# Patient Record
Sex: FEMALE | Race: WHITE | NOT HISPANIC OR LATINO | Employment: PART TIME | URBAN - METROPOLITAN AREA
[De-identification: names, ages, dates, MRNs, and addresses within clinical notes are randomized per-mention and may not be internally consistent; named-entity substitution may affect disease eponyms.]

---
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
## 2021-01-08 ENCOUNTER — MYC MEDICAL ADVICE (OUTPATIENT)
Dept: FAMILY MEDICINE | Facility: OTHER | Age: 21
End: 2021-01-08

## 2021-01-08 DIAGNOSIS — F90.0 ATTENTION DEFICIT HYPERACTIVITY DISORDER (ADHD), PREDOMINANTLY INATTENTIVE TYPE: ICD-10-CM

## 2021-01-08 RX ORDER — DEXTROAMPHETAMINE/AMPHETAMINE 15 MG
15 CAPSULE, EXT RELEASE 24 HR ORAL DAILY
Qty: 30 CAPSULE | Refills: 0 | Status: SHIPPED | OUTPATIENT
Start: 2021-01-08 | End: 2021-02-03 | Stop reason: DRUGHIGH

## 2021-01-08 RX ORDER — DEXTROAMPHETAMINE SACCHARATE, AMPHETAMINE ASPARTATE, DEXTROAMPHETAMINE SULFATE, AND AMPHETAMINE SULFATE 1.25; 1.25; 1.25; 1.25 MG/1; MG/1; MG/1; MG/1
5 TABLET ORAL DAILY
Qty: 30 TABLET | Refills: 0 | Status: SHIPPED | OUTPATIENT
Start: 2021-01-08 | End: 2021-01-14

## 2021-01-08 NOTE — TELEPHONE ENCOUNTER
Switched to Brand Name.     Please call pharmacy in [place] and cancel the generic.     Tiffanie Sequeira PA-C

## 2021-01-12 ENCOUNTER — TELEPHONE (OUTPATIENT)
Dept: PEDIATRICS | Facility: OTHER | Age: 21
End: 2021-01-12

## 2021-01-12 DIAGNOSIS — F90.0 ATTENTION DEFICIT HYPERACTIVITY DISORDER (ADHD), PREDOMINANTLY INATTENTIVE TYPE: ICD-10-CM

## 2021-01-12 NOTE — TELEPHONE ENCOUNTER
Prior Authorization Retail Medication Request    Medication/Dose: ADDERALL 5 MG tablet  ICD code (if different than what is on RX):    Previously Tried and Failed:    Rationale:      Insurance Name:   Insurance ID:        Pharmacy Information (if different than what is on RX)  Name:    Phone:

## 2021-01-13 NOTE — TELEPHONE ENCOUNTER
So the extended release has to be Brand but the immediate release is Generic, is this correct?    Tiffanie Sequeira PA-C

## 2021-01-13 NOTE — TELEPHONE ENCOUNTER
I spoke with patient's insurance company and the GENERIC adderall (amphetamine-dextroamphetamine) tablets are covered, NOT the brand name. Is patient able to take the generic IR tablets? Please send new Rx to the pharmacy for generic if this is appropriate.     The letter she received from insurance was referring to the XR CAPSULES - these are covered as brand name only this year.

## 2021-01-14 RX ORDER — DEXTROAMPHETAMINE SACCHARATE, AMPHETAMINE ASPARTATE, DEXTROAMPHETAMINE SULFATE AND AMPHETAMINE SULFATE 1.25; 1.25; 1.25; 1.25 MG/1; MG/1; MG/1; MG/1
5 TABLET ORAL DAILY
Qty: 30 TABLET | Refills: 0 | Status: SHIPPED | OUTPATIENT
Start: 2021-01-14 | End: 2021-02-03

## 2021-01-14 NOTE — TELEPHONE ENCOUNTER
Please call pharmacy.  Make sure they cancel the Brand IR and keep the Brand XR.  I sent the Generic IR.  Please make sure patient is aware of this.     Tiffanie Sequeira PA-C

## 2021-01-18 ENCOUNTER — MYC MEDICAL ADVICE (OUTPATIENT)
Dept: FAMILY MEDICINE | Facility: OTHER | Age: 21
End: 2021-01-18

## 2021-01-18 DIAGNOSIS — F41.1 GAD (GENERALIZED ANXIETY DISORDER): ICD-10-CM

## 2021-01-18 DIAGNOSIS — F33.2 SEVERE EPISODE OF RECURRENT MAJOR DEPRESSIVE DISORDER, WITHOUT PSYCHOTIC FEATURES (H): ICD-10-CM

## 2021-01-18 RX ORDER — DESVENLAFAXINE 100 MG/1
100 TABLET, EXTENDED RELEASE ORAL DAILY
Qty: 90 TABLET | Refills: 1 | Status: SHIPPED | OUTPATIENT
Start: 2021-01-18 | End: 2021-06-28

## 2021-02-03 ENCOUNTER — VIRTUAL VISIT (OUTPATIENT)
Dept: FAMILY MEDICINE | Facility: OTHER | Age: 21
End: 2021-02-03
Payer: COMMERCIAL

## 2021-02-03 DIAGNOSIS — F33.2 SEVERE EPISODE OF RECURRENT MAJOR DEPRESSIVE DISORDER, WITHOUT PSYCHOTIC FEATURES (H): ICD-10-CM

## 2021-02-03 DIAGNOSIS — E55.9 VITAMIN D DEFICIENCY: Primary | ICD-10-CM

## 2021-02-03 DIAGNOSIS — F90.0 ATTENTION DEFICIT HYPERACTIVITY DISORDER (ADHD), PREDOMINANTLY INATTENTIVE TYPE: ICD-10-CM

## 2021-02-03 DIAGNOSIS — D50.9 IRON DEFICIENCY ANEMIA, UNSPECIFIED IRON DEFICIENCY ANEMIA TYPE: ICD-10-CM

## 2021-02-03 DIAGNOSIS — F41.1 GAD (GENERALIZED ANXIETY DISORDER): ICD-10-CM

## 2021-02-03 PROCEDURE — 99214 OFFICE O/P EST MOD 30 MIN: CPT | Mod: 95 | Performed by: PHYSICIAN ASSISTANT

## 2021-02-03 RX ORDER — BUPROPION HYDROCHLORIDE 150 MG/1
150 TABLET ORAL EVERY MORNING
Qty: 90 TABLET | Refills: 1 | Status: SHIPPED | OUTPATIENT
Start: 2021-02-03 | End: 2021-08-03

## 2021-02-03 RX ORDER — DEXTROAMPHETAMINE SACCHARATE, AMPHETAMINE ASPARTATE, DEXTROAMPHETAMINE SULFATE AND AMPHETAMINE SULFATE 1.25; 1.25; 1.25; 1.25 MG/1; MG/1; MG/1; MG/1
5 TABLET ORAL DAILY
Qty: 30 TABLET | Refills: 0 | Status: SHIPPED | OUTPATIENT
Start: 2021-02-03 | End: 2021-08-03

## 2021-02-03 RX ORDER — DEXTROAMPHETAMINE SULFATE, DEXTROAMPHETAMINE SACCHARATE, AMPHETAMINE SULFATE AND AMPHETAMINE ASPARTATE 5; 5; 5; 5 MG/1; MG/1; MG/1; MG/1
20 CAPSULE, EXTENDED RELEASE ORAL DAILY
Qty: 30 CAPSULE | Refills: 0 | Status: SHIPPED | OUTPATIENT
Start: 2021-02-03 | End: 2021-03-05

## 2021-02-03 ASSESSMENT — ANXIETY QUESTIONNAIRES
3. WORRYING TOO MUCH ABOUT DIFFERENT THINGS: NOT AT ALL
GAD7 TOTAL SCORE: 5
6. BECOMING EASILY ANNOYED OR IRRITABLE: SEVERAL DAYS
7. FEELING AFRAID AS IF SOMETHING AWFUL MIGHT HAPPEN: NOT AT ALL
5. BEING SO RESTLESS THAT IT IS HARD TO SIT STILL: MORE THAN HALF THE DAYS
1. FEELING NERVOUS, ANXIOUS, OR ON EDGE: SEVERAL DAYS
4. TROUBLE RELAXING: SEVERAL DAYS
7. FEELING AFRAID AS IF SOMETHING AWFUL MIGHT HAPPEN: NOT AT ALL
GAD7 TOTAL SCORE: 5
2. NOT BEING ABLE TO STOP OR CONTROL WORRYING: NOT AT ALL
GAD7 TOTAL SCORE: 5

## 2021-02-03 NOTE — PROGRESS NOTES
Tami is a 20 year old who is being evaluated via a billable video visit.      How would you like to obtain your AVS? MyChart  If the video visit is dropped, the invitation should be resent by: Text to cell phone: [phone]  Will anyone else be joining your video visit? No    Video Start Time: 3:06 PM  Assessment & Plan     FRANKLYN (generalized anxiety disorder)  Feels like she is doing well enough she can be off the Wellbutrin, will place refill at pharmacy in case she needs to restart it but she will just continue on Pristiq for now.   - buPROPion (WELLBUTRIN XL) 150 MG 24 hr tablet; Take 1 tablet (150 mg) by mouth every morning    Severe episode of recurrent major depressive disorder, without psychotic features (H)  See above.   - buPROPion (WELLBUTRIN XL) 150 MG 24 hr tablet; Take 1 tablet (150 mg) by mouth every morning    Attention deficit hyperactivity disorder (ADHD), predominantly inattentive type  Would like to increase to 20 mg XR and see if this works better.  She has been tolerating the medications without side effects.   Did refill 5 mg to use PRN as well.   She will update us in a month if it is working well and we will send refills for 3 months. Will need to know if she is even still taking 5 mg tablet as well. If not then can stop prescribing this dose.    reviewed, as expected.   - ADDERALL XR 20 MG 24 hr capsule; Take 1 capsule (20 mg) by mouth daily  - amphetamine-dextroamphetamine (ADDERALL) 5 MG tablet; Take 1 tablet (5 mg) by mouth daily    Vitamin D deficiency  Continue on Vitamin D supplement.     Iron deficiency anemia, unspecified iron deficiency anemia type  Reviewed last CBC, has been taking iron for a year now.  Ok to stop since her menstrual cycles have nearly stopped since having Nexplanon in place.  Can re-check labs when back home.  She can restart iron If feeling symptomatic.   - CBC with platelets; Future  - Iron and iron binding capacity; Future  - Ferritin; Future      20  minutes spent on the date of the encounter doing chart review, history and exam, documentation and further activities as noted above    Return in about 4 weeks (around 3/3/2021) for Medication Re-check.    Options for treatment and follow-up care were reviewed with the patient and/or guardian. Patient and/or guardian engaged in the decision making process and verbalized understanding of the options discussed and agreed with the final plan.    Tiffanie Sequeira PA-C  [facility] ADRIA Garrett is a 20 year old who presents to clinic today for the following health issues    Answers for HPI/ROS submitted by the patient on 2/3/2021   Chronic problems general questions HPI Form  If you checked off any problems, how difficult have these problems made it for you to do your work, take care of things at home, or get along with other people?: Somewhat difficult  PHQ9 TOTAL SCORE: 8  FRANKLYN 7 TOTAL SCORE: 5    History of Present Illness       Mental Health Follow-up:  Patient presents to follow-up on Depression & Anxiety.Patient's depression since last visit has been:  No change  The patient is not having other symptoms associated with depression.  Patient's anxiety since last visit has been:  No change  The patient is not having other symptoms associated with anxiety.  Any significant life events: No  Patient is not feeling anxious or having panic attacks.  Patient has no concerns about alcohol or drug use.     Social History  Tobacco Use    Smoking status: Passive Smoke Exposure - Never Smoker    Smokeless tobacco: Never Used    Tobacco comment: outside of home  Alcohol use: No    Alcohol/week: 0.0 standard drinks  Drug use: No    Comment: no exposure      Today's PHQ-9         PHQ-9 Total Score:     (P) 8   PHQ-9 Q9 Thoughts of better off dead/self-harm past 2 weeks :   (P) Not at all   Thoughts of suicide or self harm:      Self-harm Plan:        Self-harm Action:          Safety concerns for  self or others:           Tami Rai eats 4 or more servings of fruits and vegetables daily.Tami Rai consumes 0 sweetened beverage(s) daily.Tami Rai exercises with enough effort to increase Tami Graces heart rate 20 to 29 minutes per day.  Tami Rai exercises with enough effort to increase Tami Graces heart rate 4 days per week.   Tami Rai is taking medications regularly.     Review of Systems   Constitutional,cardiovascular, pulmonary, ,and psych systems are negative, except as otherwise noted.      Objective         Vitals:  No vitals were obtained today due to virtual visit.    Physical Exam   GENERAL: Healthy, alert and no distress  EYES: Eyes grossly normal to inspection.  No discharge or erythema, or obvious scleral/conjunctival abnormalities.  RESP: No audible wheeze, cough, or visible cyanosis.  No visible retractions or increased work of breathing.    SKIN: Visible skin clear. No significant rash, abnormal pigmentation or lesions.  NEURO: Cranial nerves grossly intact.  Mentation and speech appropriate for age.  PSYCH: Mentation appears normal, affect normal/bright, judgement and insight intact, normal speech and appearance well-groomed.    Video-Visit Details    Type of service:  Video Visit    Video End Time:3:19 PM    Originating Location (pt. Location): Other [facility]    Distant Location (provider location):  [facility]     Platform used for Video Visit: [place]

## 2021-02-04 ASSESSMENT — PATIENT HEALTH QUESTIONNAIRE - PHQ9: SUM OF ALL RESPONSES TO PHQ QUESTIONS 1-9: 8

## 2021-02-04 ASSESSMENT — ANXIETY QUESTIONNAIRES: GAD7 TOTAL SCORE: 5

## 2021-03-30 ENCOUNTER — MYC MEDICAL ADVICE (OUTPATIENT)
Dept: FAMILY MEDICINE | Facility: OTHER | Age: 21
End: 2021-03-30

## 2021-03-30 DIAGNOSIS — F90.0 ATTENTION DEFICIT HYPERACTIVITY DISORDER (ADHD), PREDOMINANTLY INATTENTIVE TYPE: Primary | ICD-10-CM

## 2021-03-30 RX ORDER — DEXTROAMPHETAMINE SACCHARATE, AMPHETAMINE ASPARTATE, DEXTROAMPHETAMINE SULFATE AND AMPHETAMINE SULFATE 1.25; 1.25; 1.25; 1.25 MG/1; MG/1; MG/1; MG/1
5 TABLET ORAL DAILY
Qty: 30 TABLET | Refills: 0 | Status: SHIPPED | OUTPATIENT
Start: 2021-03-30 | End: 2021-04-29

## 2021-03-30 RX ORDER — DEXTROAMPHETAMINE SACCHARATE, AMPHETAMINE ASPARTATE, DEXTROAMPHETAMINE SULFATE AND AMPHETAMINE SULFATE 1.25; 1.25; 1.25; 1.25 MG/1; MG/1; MG/1; MG/1
5 TABLET ORAL DAILY
Qty: 30 TABLET | Refills: 0 | Status: SHIPPED | OUTPATIENT
Start: 2021-04-30 | End: 2021-05-30

## 2021-03-30 RX ORDER — DEXTROAMPHETAMINE SACCHARATE, AMPHETAMINE ASPARTATE, DEXTROAMPHETAMINE SULFATE AND AMPHETAMINE SULFATE 1.25; 1.25; 1.25; 1.25 MG/1; MG/1; MG/1; MG/1
5 TABLET ORAL DAILY
Qty: 30 TABLET | Refills: 0 | Status: SHIPPED | OUTPATIENT
Start: 2021-05-31 | End: 2021-06-30

## 2021-03-30 RX ORDER — DEXTROAMPHETAMINE SACCHARATE, AMPHETAMINE ASPARTATE MONOHYDRATE, DEXTROAMPHETAMINE SULFATE AND AMPHETAMINE SULFATE 5; 5; 5; 5 MG/1; MG/1; MG/1; MG/1
20 CAPSULE, EXTENDED RELEASE ORAL DAILY
Qty: 30 CAPSULE | Refills: 0 | Status: SHIPPED | OUTPATIENT
Start: 2021-05-31 | End: 2021-06-28

## 2021-03-30 RX ORDER — DEXTROAMPHETAMINE SACCHARATE, AMPHETAMINE ASPARTATE MONOHYDRATE, DEXTROAMPHETAMINE SULFATE AND AMPHETAMINE SULFATE 5; 5; 5; 5 MG/1; MG/1; MG/1; MG/1
20 CAPSULE, EXTENDED RELEASE ORAL DAILY
Qty: 30 CAPSULE | Refills: 0 | Status: SHIPPED | OUTPATIENT
Start: 2021-03-30 | End: 2021-04-29

## 2021-03-30 RX ORDER — DEXTROAMPHETAMINE SACCHARATE, AMPHETAMINE ASPARTATE MONOHYDRATE, DEXTROAMPHETAMINE SULFATE AND AMPHETAMINE SULFATE 5; 5; 5; 5 MG/1; MG/1; MG/1; MG/1
20 CAPSULE, EXTENDED RELEASE ORAL DAILY
Qty: 30 CAPSULE | Refills: 0 | Status: SHIPPED | OUTPATIENT
Start: 2021-04-30 | End: 2021-05-30

## 2021-06-15 NOTE — LETTER
[address]  [facility] [address]  Phone: [phone]    October 4, 2019        Tami Rai  [address]  [address]  [facility] [address]          To whom it may concern:    RE: Tami Rai    Patient was seen and treated in our clinic. Please excuse patient from work on the dates 10-3 and 10-4.     Please contact me for questions or concerns.      Sincerely,        Alyssa Farrell PA-C  
show

## 2021-06-25 NOTE — MR AVS SNAPSHOT
"              After Visit Summary   1/17/2017    aTmi Rai    MRN: [id number]           Patient Information     Date Of Birth          2000        Visit Information        Provider Department      1/17/2017 11:00 AM Radha Harvey APRN CNP [facility]        Today's Diagnoses     Influenza-like illness    -  1     Hopelessness         Anxious mood            Follow-ups after your visit        Who to contact     If you have questions or need follow up information about today's clinic visit or your schedule please contact [facility] directly at [phone].  Normal or non-critical lab and imaging results will be communicated to you by ITDatabasehart, letter or phone within 4 business days after the clinic has received the results. If you do not hear from us within 7 days, please contact the clinic through ITDatabasehart or phone. If you have a critical or abnormal lab result, we will notify you by phone as soon as possible.  Submit refill requests through Sisteer or call your pharmacy and they will forward the refill request to us. Please allow 3 business days for your refill to be completed.          Additional Information About Your Visit        MyChart Information     Sisteer lets you send messages to your doctor, view your test results, renew your prescriptions, schedule appointments and more. To sign up, go to [email or web address], contact your [facility] clinic or call [phone] during business hours.            Care EveryWhere ID     This is your Care EveryWhere ID. This could be used by other organizations to access your [facility] medical records  [id number]        Your Vitals Were     Pulse Temperature Respirations    70 97.7  F (36.5  C) (Temporal) 10    Height BMI (Body Mass Index) Last Period    5' 5\" (1.651 m) 35.28 kg/m2 12/21/2016 (Exact Date)    Breastfeeding?          No         Blood Pressure from Last 3 Encounters:   01/17/17 124/68   12/14/16 122/80   12/08/16 " 110/58    Weight from Last 3 Encounters:   01/17/17 212 lb (96.163 kg) (98.84 %*)   12/14/16 212 lb (96.163 kg) (98.87 %*)   12/08/16 207 lb (93.895 kg) (98.70 %*)     * Growth percentiles are based on [facility] 2-20 Years data.              We Performed the Following     Influenza A/B antigen        Primary Care Provider Office Phone # Fax #    Nikki Rai [phone]       [facility] [address]  [facility] [address]        Thank you!     Thank you for choosing [facility]  for your care. Our goal is always to provide you with excellent care. Hearing back from our patients is one way we can continue to improve our services. Please take a few minutes to complete the written survey that you may receive in the mail after your visit with us. Thank you!             Your Updated Medication List - Protect others around you: Learn how to safely use, store and throw away your medicines at www.disposemymeds.org.          This list is accurate as of: 1/17/17  1:46 PM.  Always use your most recent med list.                   Brand Name Dispense Instructions for use    MELATONIN PO      Take 10 mg by mouth nightly as needed       study - exenatide ER 2mg or placebo ER subcutaneous SOLR    IDS# [id number]    13 kit    Inject 2 mg Subcutaneous once a week into the upper arm, abdomen or thigh.       vitamin D 2000 UNITS tablet     30 tablet    Take 2,000 Units by mouth daily          0 = independent

## 2021-06-28 DIAGNOSIS — F33.2 SEVERE EPISODE OF RECURRENT MAJOR DEPRESSIVE DISORDER, WITHOUT PSYCHOTIC FEATURES (H): ICD-10-CM

## 2021-06-28 DIAGNOSIS — F41.1 GAD (GENERALIZED ANXIETY DISORDER): ICD-10-CM

## 2021-06-28 RX ORDER — DESVENLAFAXINE 100 MG/1
TABLET, EXTENDED RELEASE ORAL
Qty: 90 TABLET | Refills: 0 | Status: SHIPPED | OUTPATIENT
Start: 2021-06-28 | End: 2021-08-03

## 2021-06-28 NOTE — TELEPHONE ENCOUNTER
Pending Prescriptions:                       Disp   Refills    desvenlafaxine (PRISTIQ) 100 MG 24 hr tabl*90 tab*0        Sig: Take 1 tablet by mouth once daily    Routing refill request to provider for review/approval because:  Serotonin-Norepinephrine Reuptake Inhibitors  Failed      Blood pressure under 140/90 in past 12 months        BP Readings from Last 3 Encounters:   03/10/20 116/76   03/05/20 122/72   03/02/20 112/72            PHQ-9 score of less than 5 in past 6 months    Please review last PHQ-9 score.      PHQ-9 score:    PHQ 2/3/2021   PHQ-9 Total Score 8   Q9: Thoughts of better off dead/self-harm past 2 weeks Not at all   F/U: Thoughts of suicide or self-harm -   F/U: Safety concerns -               Normal serum creatinine on file in past 12 months        Recent Labs   Lab Test 03/28/19  0806   CR 0.67

## 2021-07-30 ENCOUNTER — MYC REFILL (OUTPATIENT)
Dept: FAMILY MEDICINE | Facility: OTHER | Age: 21
End: 2021-07-30

## 2021-07-30 DIAGNOSIS — F33.2 SEVERE EPISODE OF RECURRENT MAJOR DEPRESSIVE DISORDER, WITHOUT PSYCHOTIC FEATURES (H): ICD-10-CM

## 2021-07-30 DIAGNOSIS — F41.1 GAD (GENERALIZED ANXIETY DISORDER): ICD-10-CM

## 2021-07-30 RX ORDER — DESVENLAFAXINE 100 MG/1
100 TABLET, EXTENDED RELEASE ORAL DAILY
Qty: 90 TABLET | Refills: 0 | OUTPATIENT
Start: 2021-07-30

## 2021-07-30 NOTE — TELEPHONE ENCOUNTER
Sent 90 day supply end of June.     She is overdue for med check as well.     Tiffanie Sequeira PA-C

## 2021-08-03 ENCOUNTER — MYC MEDICAL ADVICE (OUTPATIENT)
Dept: FAMILY MEDICINE | Facility: OTHER | Age: 21
End: 2021-08-03

## 2021-08-03 ENCOUNTER — VIRTUAL VISIT (OUTPATIENT)
Dept: FAMILY MEDICINE | Facility: OTHER | Age: 21
End: 2021-08-03
Payer: COMMERCIAL

## 2021-08-03 DIAGNOSIS — F33.2 SEVERE EPISODE OF RECURRENT MAJOR DEPRESSIVE DISORDER, WITHOUT PSYCHOTIC FEATURES (H): ICD-10-CM

## 2021-08-03 DIAGNOSIS — G43.019 INTRACTABLE MIGRAINE WITHOUT AURA AND WITHOUT STATUS MIGRAINOSUS: ICD-10-CM

## 2021-08-03 DIAGNOSIS — F90.0 ATTENTION DEFICIT HYPERACTIVITY DISORDER (ADHD), PREDOMINANTLY INATTENTIVE TYPE: Primary | ICD-10-CM

## 2021-08-03 DIAGNOSIS — J98.01 ACUTE BRONCHOSPASM: ICD-10-CM

## 2021-08-03 DIAGNOSIS — F41.1 GAD (GENERALIZED ANXIETY DISORDER): ICD-10-CM

## 2021-08-03 PROCEDURE — 99213 OFFICE O/P EST LOW 20 MIN: CPT | Mod: 95 | Performed by: PHYSICIAN ASSISTANT

## 2021-08-03 RX ORDER — DEXTROAMPHETAMINE SACCHARATE, AMPHETAMINE ASPARTATE MONOHYDRATE, DEXTROAMPHETAMINE SULFATE AND AMPHETAMINE SULFATE 5; 5; 5; 5 MG/1; MG/1; MG/1; MG/1
20 CAPSULE, EXTENDED RELEASE ORAL DAILY
Qty: 30 CAPSULE | Refills: 0 | Status: SHIPPED | OUTPATIENT
Start: 2021-09-02 | End: 2021-08-03

## 2021-08-03 RX ORDER — DEXTROAMPHETAMINE SACCHARATE, AMPHETAMINE ASPARTATE MONOHYDRATE, DEXTROAMPHETAMINE SULFATE AND AMPHETAMINE SULFATE 5; 5; 5; 5 MG/1; MG/1; MG/1; MG/1
20 CAPSULE, EXTENDED RELEASE ORAL DAILY
Qty: 30 CAPSULE | Refills: 0 | Status: SHIPPED | OUTPATIENT
Start: 2021-08-03 | End: 2021-08-03

## 2021-08-03 RX ORDER — DEXTROAMPHETAMINE SACCHARATE, AMPHETAMINE ASPARTATE MONOHYDRATE, DEXTROAMPHETAMINE SULFATE AND AMPHETAMINE SULFATE 5; 5; 5; 5 MG/1; MG/1; MG/1; MG/1
20 CAPSULE, EXTENDED RELEASE ORAL DAILY
Qty: 30 CAPSULE | Refills: 0 | Status: SHIPPED | OUTPATIENT
Start: 2021-10-02 | End: 2021-10-11

## 2021-08-03 RX ORDER — ALBUTEROL SULFATE 90 UG/1
2 AEROSOL, METERED RESPIRATORY (INHALATION) EVERY 4 HOURS PRN
Qty: 18 G | Refills: 4 | Status: SHIPPED | OUTPATIENT
Start: 2021-08-03 | End: 2024-01-26

## 2021-08-03 RX ORDER — ALBUTEROL SULFATE 0.83 MG/ML
2.5 SOLUTION RESPIRATORY (INHALATION) EVERY 6 HOURS PRN
Qty: 90 ML | Refills: 3 | Status: SHIPPED | OUTPATIENT
Start: 2021-08-03

## 2021-08-03 RX ORDER — RIZATRIPTAN BENZOATE 10 MG/1
10 TABLET ORAL
Qty: 10 TABLET | Refills: 1 | Status: SHIPPED | OUTPATIENT
Start: 2021-08-03 | End: 2022-03-30

## 2021-08-03 RX ORDER — DEXTROAMPHETAMINE SACCHARATE, AMPHETAMINE ASPARTATE, DEXTROAMPHETAMINE SULFATE AND AMPHETAMINE SULFATE 1.25; 1.25; 1.25; 1.25 MG/1; MG/1; MG/1; MG/1
5 TABLET ORAL DAILY
Qty: 30 TABLET | Refills: 0 | Status: SHIPPED | OUTPATIENT
Start: 2021-10-02 | End: 2021-11-01

## 2021-08-03 RX ORDER — DEXTROAMPHETAMINE SACCHARATE, AMPHETAMINE ASPARTATE, DEXTROAMPHETAMINE SULFATE AND AMPHETAMINE SULFATE 1.25; 1.25; 1.25; 1.25 MG/1; MG/1; MG/1; MG/1
5 TABLET ORAL DAILY
Qty: 30 TABLET | Refills: 0 | Status: SHIPPED | OUTPATIENT
Start: 2021-08-03 | End: 2021-08-03

## 2021-08-03 RX ORDER — DESVENLAFAXINE 100 MG/1
100 TABLET, EXTENDED RELEASE ORAL DAILY
Qty: 90 TABLET | Refills: 1 | Status: SHIPPED | OUTPATIENT
Start: 2021-08-03 | End: 2021-11-04

## 2021-08-03 RX ORDER — DEXTROAMPHETAMINE SACCHARATE, AMPHETAMINE ASPARTATE, DEXTROAMPHETAMINE SULFATE AND AMPHETAMINE SULFATE 1.25; 1.25; 1.25; 1.25 MG/1; MG/1; MG/1; MG/1
5 TABLET ORAL DAILY
Qty: 30 TABLET | Refills: 0 | Status: SHIPPED | OUTPATIENT
Start: 2021-09-02 | End: 2021-08-03

## 2021-08-03 ASSESSMENT — ANXIETY QUESTIONNAIRES
IF YOU CHECKED OFF ANY PROBLEMS ON THIS QUESTIONNAIRE, HOW DIFFICULT HAVE THESE PROBLEMS MADE IT FOR YOU TO DO YOUR WORK, TAKE CARE OF THINGS AT HOME, OR GET ALONG WITH OTHER PEOPLE: SOMEWHAT DIFFICULT
7. FEELING AFRAID AS IF SOMETHING AWFUL MIGHT HAPPEN: NOT AT ALL
GAD7 TOTAL SCORE: 3
6. BECOMING EASILY ANNOYED OR IRRITABLE: NOT AT ALL
2. NOT BEING ABLE TO STOP OR CONTROL WORRYING: NOT AT ALL
1. FEELING NERVOUS, ANXIOUS, OR ON EDGE: SEVERAL DAYS
5. BEING SO RESTLESS THAT IT IS HARD TO SIT STILL: SEVERAL DAYS
3. WORRYING TOO MUCH ABOUT DIFFERENT THINGS: NOT AT ALL

## 2021-08-03 ASSESSMENT — PAIN SCALES - GENERAL: PAINLEVEL: NO PAIN (0)

## 2021-08-03 ASSESSMENT — PATIENT HEALTH QUESTIONNAIRE - PHQ9
5. POOR APPETITE OR OVEREATING: SEVERAL DAYS
SUM OF ALL RESPONSES TO PHQ QUESTIONS 1-9: 7

## 2021-08-03 NOTE — PROGRESS NOTES
Keri is a 21 year old who is being evaluated via a billable video visit.      How would you like to obtain your AVS? MyChart  If the video visit is dropped, the invitation should be resent by: Text to cell phone: [phone]  Will anyone else be joining your video visit? No    Video Start Time: 3:46 PM     Assessment & Plan     Attention deficit hyperactivity disorder (ADHD), predominantly inattentive type  For the most part stable, hasn't had to use medication as frequently during the summer.   Refilled for 3 months.   - amphetamine-dextroamphetamine (ADDERALL) 5 MG tablet; Take 1 tablet (5 mg) by mouth daily  - amphetamine-dextroamphetamine (ADDERALL XR) 20 MG 24 hr capsule; Take 1 capsule (20 mg) by mouth daily    Severe episode of recurrent major depressive disorder, without psychotic features (H)  Doing well on medication, refilled for 6 months.   - desvenlafaxine (PRISTIQ) 100 MG 24 hr tablet; Take 1 tablet (100 mg) by mouth daily    FRANKLYN (generalized anxiety disorder)  Doing well on medication refilled for 6 months.   - desvenlafaxine (PRISTIQ) 100 MG 24 hr tablet; Take 1 tablet (100 mg) by mouth daily    Intractable migraine without aura and without status migrainosus  Question if the air quality is affecting migraines, monitor closely as school year starts for now no changes to plan or medications.  - rizatriptan (MAXALT) 10 MG tablet; Take 1 tablet (10 mg) by mouth at onset of headache for migraine May repeat in 2 hours. Max 3 tablets/24 hours.    Acute bronchospasm  Refilled her albuterol to use PRN.   AAP and ACT updated and no concerns.   - albuterol (PROAIR HFA/PROVENTIL HFA/VENTOLIN HFA) 108 (90 Base) MCG/ACT inhaler; Inhale 2 puffs into the lungs every 4 hours as needed for shortness of breath / dyspnea or wheezing  - albuterol (PROVENTIL) (2.5 MG/3ML) 0.083% neb solution; Take 1 vial (2.5 mg) by nebulization every 6 hours as needed for shortness of breath / dyspnea or wheezing      Return in about  3 months (around 11/3/2021) for Medication Re-check.    Options for treatment and follow-up care were reviewed with the patient and/or guardian. Patient and/or guardian engaged in the decision making process and verbalized understanding of the options discussed and agreed with the final plan.     Tiffanie Sequeira PA-C  [facility] ServicesLIZZIE Saavedra is a 21 year old who presents for the following health issues     HPI     Tami is here today to recheck ADHD/ADD.    Updates since last visit: has been off her adderall for most of the summer     Routine for taking medicine, including time: 7-11 am   Time medicine wears off: between 3-4 pm about 5-6 hours  Issues at school: none  Issues at home: none  Control of symptoms: yes    Side effects:  Headaches: No  Stomach aches: No  Irritability/mood swings: No  Difficulties with sleep: No  Social withdrawal: No  Unusual movements/tics: No  Decreased appetite: No    Other concerns: struggling with migraines and having a hard time getting up     Off schedule for the summer but getting back into school will help.       Depression and Anxiety Follow-Up    How are you doing with your depression since your last visit? No change    How are you doing with your anxiety since your last visit?  No change    Are you having other symptoms that might be associated with depression or anxiety? Yes:  irregular sleeping, falling asleep, waking up in the morning     Have you had a significant life event? No     Do you have any concerns with your use of alcohol or other drugs? No    Social History     Tobacco Use     Smoking status: Passive Smoke Exposure - Never Smoker     Smokeless tobacco: Never Used     Tobacco comment: outside of home   Vaping Use     Vaping Use: Never used   Substance Use Topics     Alcohol use: No     Alcohol/week: 0.0 standard drinks     Drug use: No     Comment: no exposure     PHQ 10/16/2020 2/3/2021 8/3/2021   PHQ-9 Total Score 5 8 7   Q9:  Thoughts of better off dead/self-harm past 2 weeks Not at all Not at all Not at all   F/U: Thoughts of suicide or self-harm - - -   F/U: Safety concerns - - -     FRANKLYN-7 SCORE 10/16/2020 2/3/2021 8/3/2021   Total Score 7 (mild anxiety) 5 (mild anxiety) -   Total Score 7 5 3     Last PHQ-9 8/3/2021   1.  Little interest or pleasure in doing things 0   2.  Feeling down, depressed, or hopeless 0   3.  Trouble falling or staying asleep, or sleeping too much 2   4.  Feeling tired or having little energy 1   5.  Poor appetite or overeating 0   6.  Feeling bad about yourself 0   7.  Trouble concentrating 2   8.  Moving slowly or restless 2   Q9: Thoughts of better off dead/self-harm past 2 weeks 0   PHQ-9 Total Score 7   Difficulty at work, home, or with people Not difficult at all   In the past two weeks have you had thoughts of suicide or self harm? -   Do you have concerns about your personal safety or the safety of others? -     FRANKLYN-7  8/3/2021   1. Feeling nervous, anxious, or on edge 1   2. Not being able to stop or control worrying 0   3. Worrying too much about different things 0   4. Trouble relaxing 1   5. Being so restless that it is hard to sit still 1   6. Becoming easily annoyed or irritable 0   7. Feeling afraid, as if something awful might happen 0   FRANKLYN-7 Total Score 3   If you checked any problems, how difficult have they made it for you to do your work, take care of things at home, or get along with other people? Somewhat difficult         Asthma Follow-Up    Was ACT completed today?    Yes    ACT Total Scores 8/3/2021   ACT TOTAL SCORE -   ASTHMA ER VISITS -   ASTHMA HOSPITALIZATIONS -   ACT TOTAL SCORE (Goal Greater than or Equal to 20) 25   In the past 12 months, how many times did you visit the emergency room for your asthma without being admitted to the hospital? 0   In the past 12 months, how many times were you hospitalized overnight because of your asthma? 0          How many days per week do you  miss taking your asthma controller medication?  I do not have an asthma controller medication    Please describe any recent triggers for your asthma: Patient is unaware of triggers    Have you had any Emergency Room Visits, Urgent Care Visits, or Hospital Admissions since your last office visit?  No    Migraine     Since your last clinic visit, how have your headaches changed?  Worsened    How often are you getting headaches or migraines? Twice weekly      Are you able to do normal daily activities when you have a migraine? No    Are you taking rescue/relief medications? (Select all that apply) Excedrin and Maxalt    How helpful is your rescue/relief medication?  I get some relief    Are you taking any medications to prevent migraines? (Select all that apply)  No  In the past 4 weeks, how often have you gone to urgent care or the emergency room because of your headaches?  0    Review of Systems   Constitutional, HEENT, cardiovascular, pulmonary, GI, , musculoskeletal, neuro, skin, endocrine and psych systems are negative, except as otherwise noted.      Objective     Vitals - Patient Reported  Pain Score: No Pain (0)      Vitals:  No vitals were obtained today due to virtual visit.    Physical Exam   GENERAL: Healthy, alert and no distress  EYES: Eyes grossly normal to inspection.  No discharge or erythema, or obvious scleral/conjunctival abnormalities.  RESP: No audible wheeze, cough, or visible cyanosis.  No visible retractions or increased work of breathing.    SKIN: Visible skin clear. No significant rash, abnormal pigmentation or lesions.  NEURO: Cranial nerves grossly intact.  Mentation and speech appropriate for age.  PSYCH: Mentation appears normal, affect normal/bright, judgement and insight intact, normal speech and appearance well-groomed.          Video-Visit Details    Type of service:  Video Visit    Video End Time:3:55 PM    Originating Location (pt. Location): Home    Distant Location (provider  location):  [facility]     Platform used for Video Visit: [employer]

## 2021-08-03 NOTE — LETTER
My Asthma Action Plan    Name: Tami Rai   YOB: 2000  Date: 8/3/2021   My doctor: Tiffanie Sequeira PA-C   My clinic: [facility]        My Rescue Medicine:   Albuterol inhaler (Proair/Ventolin/Proventil HFA)  2-4 puffs EVERY 4 HOURS as needed. Use a spacer if recommended by your provider.   My Asthma Severity:   Intermittent / Exercise Induced  Know your asthma triggers:    Patient is unaware of triggers          GREEN ZONE   Good Control    I feel good    No cough or wheeze    Can work, sleep and play without asthma symptoms       Take your asthma control medicine every day.     1. If exercise triggers your asthma, take your rescue medication    15 minutes before exercise or sports, and    During exercise if you have asthma symptoms  2. Spacer to use with inhaler: If you have a spacer, make sure to use it with your inhaler             YELLOW ZONE Getting Worse  I have ANY of these:    I do not feel good    Cough or wheeze    Chest feels tight    Wake up at night   1. Keep taking your Green Zone medications  2. Start taking your rescue medicine:    every 20 minutes for up to 1 hour. Then every 4 hours for 24-48 hours.  3. If you stay in the Yellow Zone for more than 12-24 hours, contact your doctor.  4. If you do not return to the Green Zone in 12-24 hours or you get worse, start taking your oral steroid medicine if prescribed by your provider.           RED ZONE Medical Alert - Get Help  I have ANY of these:    I feel awful    Medicine is not helping    Breathing getting harder    Trouble walking or talking    Nose opens wide to breathe       1. Take your rescue medicine NOW  2. If your provider has prescribed an oral steroid medicine, start taking it NOW  3. Call your doctor NOW  4. If you are still in the Red Zone after 20 minutes and you have not reached your doctor:    Take your rescue medicine again and    Call 911 or go to the emergency room right away    See your  regular doctor within 2 weeks of an Emergency Room or Urgent Care visit for follow-up treatment.          Annual Reminders:  Meet with Asthma Educator,  Flu Shot in the Fall, consider Pneumonia Vaccination for patients with asthma (aged 19 and older).    Pharmacy:    [facility] [place] [facility]  [facility] - [place], [place] - [address] [facility] - [place], [place] - [address] [facility]    Electronically signed by Tiffanie Sequeira PA-C   Date: 08/03/21                    Asthma Triggers  How To Control Things That Make Your Asthma Worse    Triggers are things that make your asthma worse.  Look at the list below to help you find your triggers and   what you can do about them. You can help prevent asthma flare-ups by staying away from your triggers.      Trigger                                                          What you can do   Cigarette Smoke  Tobacco smoke can make asthma worse. Do not allow smoking in your home, car or around you.  Be sure no one smokes at a child s day care or school.  If you smoke, ask your health care provider for ways to help you quit.  Ask family members to quit too.  Ask your health care provider for a referral to Quit Plan to help you quit smoking, or call 3-237-533-PLAN.     Colds, Flu, Bronchitis  These are common triggers of asthma. Wash your hands often.  Don t touch your eyes, nose or mouth.  Get a flu shot every year.     Dust Mites  These are tiny bugs that live in cloth or carpet. They are too small to see. Wash sheets and blankets in hot water every week.   Encase pillows and mattress in dust mite proof covers.  Avoid having carpet if you can. If you have carpet, vacuum weekly.   Use a dust mask and HEPA vacuum.   Pollen and Outdoor Mold  Some people are allergic to trees, grass, or weed pollen, or molds. Try to keep your windows closed.  Limit time out doors when pollen count is  high.   Ask you health care provider about taking medicine during allergy season.     Animal Dander  Some people are allergic to skin flakes, urine or saliva from pets with fur or feathers. Keep pets with fur or feathers out of your home.    If you can t keep the pet outdoors, then keep the pet out of your bedroom.  Keep the bedroom door closed.  Keep pets off cloth furniture and away from stuffed toys.     Mice, Rats, and Cockroaches  Some people are allergic to the waste from these pests.   Cover food and garbage.  Clean up spills and food crumbs.  Store grease in the refrigerator.   Keep food out of the bedroom.   Indoor Mold  This can be a trigger if your home has high moisture. Fix leaking faucets, pipes, or other sources of water.   Clean moldy surfaces.  Dehumidify basement if it is damp and smelly.   Smoke, Strong Odors, and Sprays  These can reduce air quality. Stay away from strong odors and sprays, such as perfume, powder, hair spray, paints, smoke incense, paint, cleaning products, candles and new carpet.   Exercise or Sports  Some people with asthma have this trigger. Be active!  Ask your doctor about taking medicine before sports or exercise to prevent symptoms.    Warm up for 5-10 minutes before and after sports or exercise.     Other Triggers of Asthma  Cold air:  Cover your nose and mouth with a scarf.  Sometimes laughing or crying can be a trigger.  Some medicines and food can trigger asthma.

## 2021-08-03 NOTE — LETTER
My Depression Action Plan  Name: Tami Rai   Date of Birth 2000  Date: 8/3/2021    My doctor: Tiffanie Sequeira   My clinic: [address]  [facility] [id number] [phone]          GREEN    ZONE   Good Control    What it looks like:     Things are going generally well. You have normal ups and downs. You may even feel depressed from time to time, but bad moods usually last less than a day.   What you need to do:  1. Continue to care for yourself (see self care plan)  2. Check your depression survival kit and update it as needed  3. Follow your physician s recommendations including any medication.  4. Do not stop taking medication unless you consult with your physician first.           YELLOW         ZONE Getting Worse    What it looks like:     Depression is starting to interfere with your life.     It may be hard to get out of bed; you may be starting to isolate yourself from others.    Symptoms of depression are starting to last most all day and this has happened for several days.     You may have suicidal thoughts but they are not constant.   What you need to do:     1. Call your care team. Your response to treatment will improve if you keep your care team informed of your progress. Yellow periods are signs an adjustment may need to be made.     2. Continue your self-care.  Just get dressed and ready for the day.  Don't give yourself time to talk yourself out of it.    3. Talk to someone in your support network.    4. Open up your Depression Self-Care Plan/Wellness Kit.           RED    ZONE Medical Alert - Get Help    What it looks like:     Depression is seriously interfering with your life.     You may experience these or other symptoms: You can t get out of bed most days, can t work or engage in other necessary activities, you have trouble taking care of basic hygiene, or basic responsibilities, thoughts of suicide or death that will  not go away, self-injurious behavior.     What you need to do:  1. Call your care team and request a same-day appointment. If they are not available (weekends or after hours) call your local crisis line, emergency room or 911.          Depression Self-Care Plan / Wellness Kit    Many people find that medication and therapy are helpful treatments for managing depression. In addition, making small changes to your everyday life can help to boost your mood and improve your wellbeing. Below are some tips for you to consider. Be sure to talk with your medical provider and/or behavioral health consultant if your symptoms are worsening or not improving.     Sleep   Sleep hygiene  means all of the habits that support good, restful sleep. It includes maintaining a consistent bedtime and wake time, using your bedroom only for sleeping or sex, and keeping the bedroom dark and free of distractions like a computer, smartphone, or television.     Develop a Healthy Routine  Maintain good hygiene. Get out of bed in the morning, make your bed, brush your teeth, take a shower, and get dressed. Don t spend too much time viewing media that makes you feel stressed. Find time to relax each day.    Exercise  Get some form of exercise every day. This will help reduce pain and release endorphins, the  feel good  chemicals in your brain. It can be as simple as just going for a walk or doing some gardening, anything that will get you moving.      Diet  Strive to eat healthy foods, including fruits and vegetables. Drink plenty of water. Avoid excessive sugar, caffeine, alcohol, and other mood-altering substances.     Stay Connected with Others  Stay in touch with friends and family members.    Manage Your Mood  Try deep breathing, massage therapy, biofeedback, or meditation. Take part in fun activities when you can. Try to find something to smile about each day.     Psychotherapy  Be open to working with a therapist if your provider recommends  it.     Medication  Be sure to take your medication as prescribed. Most anti-depressants need to be taken every day. It usually takes several weeks for medications to work. Not all medicines work for all people. It is important to follow-up with your provider to make sure you have a treatment plan that is working for you. Do not stop your medication abruptly without first discussing it with your provider.    Crisis Resources   These hotlines are for both adults and children. They and are open 24 hours a day, 7 days a week unless noted otherwise.      National Suicide Prevention Lifeline   9-331-111-TALK (0427)      Crisis Text Line    www.crisistextline.org  Text HOME to 311976 from anywhere in the [place], anytime, about any type of crisis. A live, trained crisis counselor will receive the text and respond quickly.      [facility] Lifeline for LGBTQ Youth  A national crisis intervention and suicide lifeline for LGBTQ youth under 25. Provides a safe place to talk without judgement. Call 1-581.164.3772; text START to 867344 or visit www.thetrevorproject.org to talk to a trained counselor.      For [facility] crisis numbers, visit the [facility] website at:  https://[place].gov/dhs/people-we-serve/adults/health-care/mental-health/resources/crisis-contacts.jsp

## 2021-08-04 ASSESSMENT — ANXIETY QUESTIONNAIRES: GAD7 TOTAL SCORE: 3

## 2021-08-04 ASSESSMENT — ASTHMA QUESTIONNAIRES: ACT_TOTALSCORE: 25

## 2021-08-23 ENCOUNTER — MYC MEDICAL ADVICE (OUTPATIENT)
Dept: FAMILY MEDICINE | Facility: OTHER | Age: 21
End: 2021-08-23

## 2021-08-23 ENCOUNTER — VIRTUAL VISIT (OUTPATIENT)
Dept: FAMILY MEDICINE | Facility: OTHER | Age: 21
End: 2021-08-23
Payer: COMMERCIAL

## 2021-08-23 VITALS — BODY MASS INDEX: 48.82 KG/M2 | HEIGHT: 65 IN | WEIGHT: 293 LBS

## 2021-08-23 DIAGNOSIS — E66.01 CLASS 3 SEVERE OBESITY DUE TO EXCESS CALORIES WITHOUT SERIOUS COMORBIDITY WITH BODY MASS INDEX (BMI) OF 50.0 TO 59.9 IN ADULT (H): Primary | ICD-10-CM

## 2021-08-23 DIAGNOSIS — M25.50 MULTIPLE JOINT PAIN: ICD-10-CM

## 2021-08-23 DIAGNOSIS — E66.813 CLASS 3 SEVERE OBESITY DUE TO EXCESS CALORIES WITHOUT SERIOUS COMORBIDITY WITH BODY MASS INDEX (BMI) OF 50.0 TO 59.9 IN ADULT (H): Primary | ICD-10-CM

## 2021-08-23 DIAGNOSIS — D50.9 IRON DEFICIENCY ANEMIA, UNSPECIFIED IRON DEFICIENCY ANEMIA TYPE: ICD-10-CM

## 2021-08-23 PROCEDURE — 99214 OFFICE O/P EST MOD 30 MIN: CPT | Mod: 95 | Performed by: PHYSICIAN ASSISTANT

## 2021-08-23 ASSESSMENT — MIFFLIN-ST. JEOR: SCORE: 2236

## 2021-08-23 NOTE — PROGRESS NOTES
Keri is a 21 year old who is being evaluated via a billable video visit.      How would you like to obtain your AVS? MyChart  If the video visit is dropped, the invitation should be resent by: Text to cell phone:    Will anyone else be joining your video visit? No    Video Start Time: 10:38 AM    Assessment & Plan     Class 3 severe obesity due to excess calories without serious comorbidity with body mass index (BMI) of 50.0 to 59.9 in adult (H)  Main topics discussed today were revolving around her weight.   Encouraged to actually increase her calorie intake as she is in likely too much of a deficit.  Encouraged more weight exercises on top of cardio to help with weight loss in general.  She is vegetarian and encouraged to increase more protein, she is already switching to more whole grain options when able and watching labels more.  Consider nutrition referral including bariatric clinic referral in the future if needed.      Multiple joint pain  This is likely more related to increased activity and current obesity playing a role.  Discussed physical therapy close to her to try and help with some of the pain as well.      She is going to check if insurance will cover labs at another facility, consider following labs:  Repeating the CBC and iron labs we have ordered already  Repeating a TSH  Consider ESR, CRP, RF, GRANT, Anti-CCP, Tick borne illness testing as well.         Return in about 4 weeks (around 9/20/2021) for Recheck.    Options for treatment and follow-up care were reviewed with the patient and/or guardian. Patient and/or guardian engaged in the decision making process and verbalized understanding of the options discussed and agreed with the final plan.    Tiffanie Sequeira PA-C  [facility]   Keri is a 21 year old who presents for the following health issues     HPI     - She is at a point where she wants to work on losing weight.   - She has had issues in general  in the past with joint pains and tendon pains.   - It's different from her muscle soreness   - Has had problems with her wrists in the past, now dealing with her ankle being more sore and painful.   - has tried to lose weight but watching her intake.  Counting calories in her head right now.  Going between 3029-3200 calories right now.   - Currently at 323 lbs. She did get down to 305-310 lbs in the winter last year when she was working on it.      Review of Systems   Constitutional, HEENT, cardiovascular, pulmonary, gi and gu systems are negative, except as otherwise noted.      Objective           Vitals:  No vitals were obtained today due to virtual visit.    Physical Exam   GENERAL: Healthy, alert and no distress  EYES: Eyes grossly normal to inspection.  No discharge or erythema, or obvious scleral/conjunctival abnormalities.  RESP: No audible wheeze, cough, or visible cyanosis.  No visible retractions or increased work of breathing.    SKIN: Visible skin clear. No significant rash, abnormal pigmentation or lesions.  NEURO: Cranial nerves grossly intact.  Mentation and speech appropriate for age.  PSYCH: Mentation appears normal, affect normal/bright, judgement and insight intact, normal speech and appearance well-groomed.      Video-Visit Details    Type of service:  Video Visit    Video End Time:10:59 AM    Originating Location (pt. Location): Home    Distant Location (provider location):  [facility]     Platform used for Video Visit: [employer]

## 2021-09-03 ENCOUNTER — NURSE TRIAGE (OUTPATIENT)
Dept: NURSING | Facility: CLINIC | Age: 21
End: 2021-09-03

## 2021-09-03 NOTE — TELEPHONE ENCOUNTER
Patient had wisdom teeth surgery last week. On the bottom right side has a flap of gums that is hanging out on the cheek side. Patient unsure if a stitch broke and if this is concerning and needs to be addressed this weekend or will this heal on it's own.   Protocol does not speak to this. Recommend calling 24 hour emergency dentist and asking if this is something that needs to be addressed.   Denies fever, bleeding, pain, swelling.   Protocol recommends home care.   Care advice given. Patient will call back with any worsening symptoms.   Kell Collins RN   09/03/21 6:36 PM  [facility] Nurse Advisor  COVID 19 Nurse Triage Plan/Patient Instructions    Please be aware that novel coronavirus (COVID-19) may be circulating in the community. If you develop symptoms such as fever, cough, or SOB or if you have concerns about the presence of another infection including coronavirus (COVID-19), please contact your health care provider or visit [email or web address].     Disposition/Instructions    Home care recommended. Follow home care protocol based instructions.    Thank you for taking steps to prevent the spread of this virus.  o Limit your contact with others.  o Wear a simple mask to cover your cough.  o Wash your hands well and often.    Resources    [facility]: About COVID-19: [email or web address]    CDC: What to Do If You're Sick: www.cdc.gov/coronavirus/2019-ncov/about/steps-when-sick.html    CDC: Ending Home Isolation: www.cdc.gov/coronavirus/2019-ncov/hcp/disposition-in-home-patients.html     CDC: Caring for Someone: www.cdc.gov/coronavirus/2019-ncov/if-you-are-sick/care-for-someone.html     [facility]: Interim Guidance for Hospital Discharge to Home: www.health.[facility].[place]/diseases/coronavirus/hcp/hospdischarge.pdf    [facility] clinical trials (COVID-19 research studies): [email or web address]     Below are the COVID-19 hotlines at the [place]  Department of Health ([facility]). Interpreters are available.   o For health questions: Call 524-975-2303 or 1-791.660.9922 (7 a.m. to 7 p.m.)  o For questions about schools and childcare: Call 183-068-7264 or 1-970.933.2051 (7 a.m. to 7 p.m.)     Reason for Disposition    Tooth extraction, general questions about    Additional Information    Negative: Sounds like a life-threatening emergency to the triager    Negative: Tooth knocked out    Negative: [1] Bleeding present > 30 minutes AND [2] using correct technique of direct pressure    Negative: [1] Bleeding now AND [2] second call after being instructed in correct technique of direct pressure    Negative: [1] Has packing sutured over socket (extraction site) AND [2] now bleeding around the packing (Exception: few drops or ooze)    Negative: Tongue is very swollen and tender    Negative: [1] Face is swollen AND [2] fever    Negative: Patient sounds very sick or weak to the triager    Negative: [1] SEVERE pain (e.g., excruciating, unable to do any normal activities) AND [2] not improved 2 hours after pain medicine    Negative: Face is very swollen    Negative: Fever    Negative: [1] Caller has URGENT question AND [2] triager unable to answer question    Negative: [1] SEVERE pain (e.g., excruciating, pain scale 8-10) AND [2] begins or increases > 2 days (48 hours) after tooth was removed    Negative: [1] Foul taste or odor in mouth AND [2] begins or increases > 2 days (48 hours) after tooth was removed    Negative: [1] Bleeding off and on AND [2] persists > 1 day (24 hours) after tooth was removed    Negative: [1] Face is swollen AND [2] skin is red or tender to touch    Negative: [1] Face is swollen AND [2] begins or increases > 2 days (48 hours) after tooth was removed    Negative: [1] Caller has NON-URGENT question AND [2] triager unable to answer question    Negative: [1] Tooth extraction > 3 days ago AND [2] pain not improving    Protocols used: TOOTH  [id number]

## 2021-09-19 ENCOUNTER — HEALTH MAINTENANCE LETTER (OUTPATIENT)
Age: 21
End: 2021-09-19

## 2021-10-11 ENCOUNTER — MYC REFILL (OUTPATIENT)
Dept: FAMILY MEDICINE | Facility: OTHER | Age: 21
End: 2021-10-11

## 2021-10-11 DIAGNOSIS — F90.0 ATTENTION DEFICIT HYPERACTIVITY DISORDER (ADHD), PREDOMINANTLY INATTENTIVE TYPE: ICD-10-CM

## 2021-10-11 DIAGNOSIS — F41.1 GAD (GENERALIZED ANXIETY DISORDER): ICD-10-CM

## 2021-10-11 DIAGNOSIS — F33.2 SEVERE EPISODE OF RECURRENT MAJOR DEPRESSIVE DISORDER, WITHOUT PSYCHOTIC FEATURES (H): ICD-10-CM

## 2021-10-11 RX ORDER — DEXTROAMPHETAMINE SACCHARATE, AMPHETAMINE ASPARTATE MONOHYDRATE, DEXTROAMPHETAMINE SULFATE AND AMPHETAMINE SULFATE 5; 5; 5; 5 MG/1; MG/1; MG/1; MG/1
20 CAPSULE, EXTENDED RELEASE ORAL DAILY
Qty: 30 CAPSULE | Refills: 0 | Status: SHIPPED | OUTPATIENT
Start: 2021-10-11 | End: 2022-01-12

## 2021-10-11 NOTE — TELEPHONE ENCOUNTER
ES patient. Last visit 8/3/21.  reviewed.      Due for routine fill. E-prescribed.    Gage Kumar-JAIMIE Andrew  [facility]

## 2021-10-13 ENCOUNTER — TELEPHONE (OUTPATIENT)
Dept: FAMILY MEDICINE | Facility: OTHER | Age: 21
End: 2021-10-13

## 2021-10-13 DIAGNOSIS — F41.1 GAD (GENERALIZED ANXIETY DISORDER): ICD-10-CM

## 2021-10-13 DIAGNOSIS — F33.2 SEVERE EPISODE OF RECURRENT MAJOR DEPRESSIVE DISORDER, WITHOUT PSYCHOTIC FEATURES (H): ICD-10-CM

## 2021-10-13 RX ORDER — DESVENLAFAXINE 100 MG/1
TABLET, EXTENDED RELEASE ORAL
Qty: 90 TABLET | Refills: 0 | OUTPATIENT
Start: 2021-10-13

## 2021-10-18 NOTE — TELEPHONE ENCOUNTER
Prior Authorization Approval    Authorization Effective Date: 8/13/2021  Authorization Expiration Date: 10/18/2022  Medication: desvenlafaxine (PRISTIQ) 100 MG 24 hr tablet  Approved Dose/Quantity:    Reference #:     Insurance Company: [employer] [place] - Phone [phone] Fax [phone]  Expected CoPay:       CoPay Card Available:      Foundation Assistance Needed:    Which Pharmacy is filling the prescription (Not needed for infusion/clinic administered): [facility] [address] [place] [address]  Pharmacy Notified: Yes  Patient Notified: Yes  **Instructed pharmacy to notify patient when script is ready to /ship.**

## 2021-10-18 NOTE — TELEPHONE ENCOUNTER
Central Prior Authorization Team   Phone: [phone]    PA Initiation    Medication: desvenlafaxine (PRISTIQ) 100 MG 24 hr tablet  Insurance Company: [employer] [place] - Phone [phone] Fax [phone]  Pharmacy Filling the Rx: [facility] [address] [place] [address]  Filling Pharmacy Phone: [phone]  Filling Pharmacy Fax:    Start Date: 10/18/2021

## 2021-11-01 DIAGNOSIS — F41.1 GAD (GENERALIZED ANXIETY DISORDER): ICD-10-CM

## 2021-11-01 DIAGNOSIS — F33.2 SEVERE EPISODE OF RECURRENT MAJOR DEPRESSIVE DISORDER, WITHOUT PSYCHOTIC FEATURES (H): ICD-10-CM

## 2021-11-04 RX ORDER — DESVENLAFAXINE 100 MG/1
TABLET, EXTENDED RELEASE ORAL
Qty: 90 TABLET | Refills: 0 | Status: SHIPPED | OUTPATIENT
Start: 2021-11-04 | End: 2022-02-06

## 2021-11-04 NOTE — TELEPHONE ENCOUNTER
Pending Prescriptions:                       Disp   Refills    desvenlafaxine (PRISTIQ) 100 MG 24 hr tabl*90 tab*0        Sig: Take 1 tablet by mouth once daily    Routing refill request to provider for review/approval because:  Labs out of range:  PHQ-9 score:    PHQ 8/3/2021   PHQ-9 Total Score 7   Q9: Thoughts of better off dead/self-harm past 2 weeks Not at all   F/U: Thoughts of suicide or self-harm -   F/U: Safety concerns -     \  Creatinine   Date Value Ref Range Status   03/28/2019 0.67 0.50 - 1.00 mg/dL Final     BP Readings from Last 3 Encounters:   03/10/20 116/76   03/05/20 122/72   03/02/20 112/72      over 13 months, RN unable to provide a brandon refill.

## 2021-11-16 NOTE — ASSESSMENT & PLAN NOTE
Immediate nausea and diarrhea after consuming wheat.  This is not every time.  Negative celiac work-up.  Wheat skin testing negative today.    -Serum IgE for wheat.  If this is negative based on unclear clinical history will allow her to continue to consume.   [facility]

## 2022-01-09 ENCOUNTER — HEALTH MAINTENANCE LETTER (OUTPATIENT)
Age: 22
End: 2022-01-09

## 2022-01-12 ENCOUNTER — MYC REFILL (OUTPATIENT)
Dept: FAMILY MEDICINE | Facility: OTHER | Age: 22
End: 2022-01-12
Payer: COMMERCIAL

## 2022-01-12 ENCOUNTER — TELEPHONE (OUTPATIENT)
Dept: FAMILY MEDICINE | Facility: OTHER | Age: 22
End: 2022-01-12
Payer: COMMERCIAL

## 2022-01-12 DIAGNOSIS — F90.0 ATTENTION DEFICIT HYPERACTIVITY DISORDER (ADHD), PREDOMINANTLY INATTENTIVE TYPE: ICD-10-CM

## 2022-01-12 RX ORDER — DEXTROAMPHETAMINE SACCHARATE, AMPHETAMINE ASPARTATE MONOHYDRATE, DEXTROAMPHETAMINE SULFATE AND AMPHETAMINE SULFATE 5; 5; 5; 5 MG/1; MG/1; MG/1; MG/1
20 CAPSULE, EXTENDED RELEASE ORAL DAILY
Qty: 30 CAPSULE | Refills: 0 | Status: SHIPPED | OUTPATIENT
Start: 2022-01-12 | End: 2022-02-16

## 2022-01-12 NOTE — TELEPHONE ENCOUNTER
Pending Prescriptions:                       Disp   Refills    amphetamine-dextroamphetamine (ADDERALL XR*30 cap*0        Sig: Take 1 capsule (20 mg) by mouth daily        Routing refill request to provider for review/approval because:  Drug not on the [facility] refill protocol     ESTRELLA SimmonsN, RN, PHN  [place]  January 12, 2022

## 2022-01-12 NOTE — TELEPHONE ENCOUNTER
Prior Authorization Retail Medication Request    Medication/Dose: amphetamine-dextroamphetamine (ADDERALL XR) 20 MG 24 hr capsule  ICD code (if different than what is on RX):    Previously Tried and Failed:    Rationale:     Insurance Name:  Insurance ID:        Pharmacy Information (if different than what is on RX)  Name:   Phone:

## 2022-01-12 NOTE — TELEPHONE ENCOUNTER
Filled in PCP absence. Routing to PCP for consideration.    Gage Farrell PA-C  [facility]

## 2022-01-14 NOTE — TELEPHONE ENCOUNTER
Prior Authorization Not Needed per Insurance    Medication: amphetamine-dextroamphetamine (ADDERALL XR) 20 MG 24 hr capsule  Insurance Company: [employer] - Phone [phone] Fax [phone]  Expected CoPay:      Pharmacy Filling the Rx: [facility] [address] [place] [address]  Pharmacy Notified: Yes  Patient Notified: Yes    Spoke with pharmacy, no PA needed.

## 2022-01-31 ENCOUNTER — TELEPHONE (OUTPATIENT)
Dept: FAMILY MEDICINE | Facility: OTHER | Age: 22
End: 2022-01-31
Payer: COMMERCIAL

## 2022-01-31 NOTE — TELEPHONE ENCOUNTER
Spoke with patient. Wondering when to get her nexplanon changed.  Per nexplanon web site they are good for 3 years.    Chaparro Herrera, ESTRELLAN, RN, PHN  [facility] ~ Registered Nurse  Clinic Triage ~ [place] [facility] [place]  January 31, 2022

## 2022-02-04 DIAGNOSIS — F33.2 SEVERE EPISODE OF RECURRENT MAJOR DEPRESSIVE DISORDER, WITHOUT PSYCHOTIC FEATURES (H): ICD-10-CM

## 2022-02-04 DIAGNOSIS — F41.1 GAD (GENERALIZED ANXIETY DISORDER): ICD-10-CM

## 2022-02-06 RX ORDER — DESVENLAFAXINE 100 MG/1
TABLET, EXTENDED RELEASE ORAL
Qty: 90 TABLET | Refills: 0 | Status: SHIPPED | OUTPATIENT
Start: 2022-02-06 | End: 2022-02-16

## 2022-02-15 ENCOUNTER — MYC MEDICAL ADVICE (OUTPATIENT)
Dept: FAMILY MEDICINE | Facility: OTHER | Age: 22
End: 2022-02-15
Payer: COMMERCIAL

## 2022-02-16 ENCOUNTER — VIRTUAL VISIT (OUTPATIENT)
Dept: FAMILY MEDICINE | Facility: OTHER | Age: 22
End: 2022-02-16
Payer: COMMERCIAL

## 2022-02-16 DIAGNOSIS — F41.1 GAD (GENERALIZED ANXIETY DISORDER): ICD-10-CM

## 2022-02-16 DIAGNOSIS — F90.0 ATTENTION DEFICIT HYPERACTIVITY DISORDER (ADHD), PREDOMINANTLY INATTENTIVE TYPE: ICD-10-CM

## 2022-02-16 DIAGNOSIS — F33.2 SEVERE EPISODE OF RECURRENT MAJOR DEPRESSIVE DISORDER, WITHOUT PSYCHOTIC FEATURES (H): ICD-10-CM

## 2022-02-16 DIAGNOSIS — J01.90 ACUTE SINUSITIS, RECURRENCE NOT SPECIFIED, UNSPECIFIED LOCATION: Primary | ICD-10-CM

## 2022-02-16 PROCEDURE — 99214 OFFICE O/P EST MOD 30 MIN: CPT | Mod: 95 | Performed by: PHYSICIAN ASSISTANT

## 2022-02-16 RX ORDER — DESVENLAFAXINE 100 MG/1
100 TABLET, EXTENDED RELEASE ORAL DAILY
Qty: 90 TABLET | Refills: 1 | Status: SHIPPED | OUTPATIENT
Start: 2022-02-16 | End: 2022-05-17

## 2022-02-16 RX ORDER — DEXTROAMPHETAMINE SULFATE, DEXTROAMPHETAMINE SACCHARATE, AMPHETAMINE SULFATE AND AMPHETAMINE ASPARTATE 5; 5; 5; 5 MG/1; MG/1; MG/1; MG/1
20 CAPSULE, EXTENDED RELEASE ORAL DAILY
Qty: 30 CAPSULE | Refills: 0 | Status: SHIPPED | OUTPATIENT
Start: 2022-04-19 | End: 2022-05-19

## 2022-02-16 RX ORDER — DEXTROAMPHETAMINE SULFATE, DEXTROAMPHETAMINE SACCHARATE, AMPHETAMINE SULFATE AND AMPHETAMINE ASPARTATE 5; 5; 5; 5 MG/1; MG/1; MG/1; MG/1
20 CAPSULE, EXTENDED RELEASE ORAL DAILY
Qty: 30 CAPSULE | Refills: 0 | Status: SHIPPED | OUTPATIENT
Start: 2022-02-16 | End: 2022-03-18

## 2022-02-16 RX ORDER — PREDNISONE 20 MG/1
40 TABLET ORAL DAILY
Qty: 10 TABLET | Refills: 0 | Status: SHIPPED | OUTPATIENT
Start: 2022-02-16 | End: 2022-02-21

## 2022-02-16 RX ORDER — DEXTROAMPHETAMINE SULFATE, DEXTROAMPHETAMINE SACCHARATE, AMPHETAMINE SULFATE AND AMPHETAMINE ASPARTATE 5; 5; 5; 5 MG/1; MG/1; MG/1; MG/1
20 CAPSULE, EXTENDED RELEASE ORAL DAILY
Qty: 30 CAPSULE | Refills: 0 | Status: SHIPPED | OUTPATIENT
Start: 2022-03-19 | End: 2022-04-18

## 2022-02-16 ASSESSMENT — ANXIETY QUESTIONNAIRES
3. WORRYING TOO MUCH ABOUT DIFFERENT THINGS: SEVERAL DAYS
GAD7 TOTAL SCORE: 4
1. FEELING NERVOUS, ANXIOUS, OR ON EDGE: SEVERAL DAYS
5. BEING SO RESTLESS THAT IT IS HARD TO SIT STILL: SEVERAL DAYS
2. NOT BEING ABLE TO STOP OR CONTROL WORRYING: NOT AT ALL
7. FEELING AFRAID AS IF SOMETHING AWFUL MIGHT HAPPEN: NOT AT ALL
GAD7 TOTAL SCORE: 4
7. FEELING AFRAID AS IF SOMETHING AWFUL MIGHT HAPPEN: NOT AT ALL
4. TROUBLE RELAXING: SEVERAL DAYS
GAD7 TOTAL SCORE: 4
6. BECOMING EASILY ANNOYED OR IRRITABLE: NOT AT ALL

## 2022-02-16 ASSESSMENT — PATIENT HEALTH QUESTIONNAIRE - PHQ9
10. IF YOU CHECKED OFF ANY PROBLEMS, HOW DIFFICULT HAVE THESE PROBLEMS MADE IT FOR YOU TO DO YOUR WORK, TAKE CARE OF THINGS AT HOME, OR GET ALONG WITH OTHER PEOPLE: SOMEWHAT DIFFICULT
SUM OF ALL RESPONSES TO PHQ QUESTIONS 1-9: 6
SUM OF ALL RESPONSES TO PHQ QUESTIONS 1-9: 6

## 2022-02-16 ASSESSMENT — ASTHMA QUESTIONNAIRES: ACT_TOTALSCORE: 25

## 2022-02-16 NOTE — PROGRESS NOTES
Keri is a 21 year old who is being evaluated via a billable video visit.      How would you like to obtain your AVS? MyChart  If the video visit is dropped, the invitation should be resent by: Text to cell phone: [phone]  Will anyone else be joining your video visit? No    Video Start Time: 2:58 PM    Assessment & Plan     Acute sinusitis, recurrence not specified, unspecified location  She is seeing ENT coming up.   She has had mood changes on Prednisone in past. However she is not feeling well with her sinuses.   Recommended prednisone, can take just 20 mg once daily or 20 mg BID if needed if 40 mg daily is too much.   Continue on the spray and see ENT.   - predniSONE (DELTASONE) 20 MG tablet; Take 2 tablets (40 mg) by mouth daily for 5 days    Severe episode of recurrent major depressive disorder, without psychotic features (H)  She is stable on medication and doing well.   - desvenlafaxine (PRISTIQ) 100 MG 24 hr tablet; Take 1 tablet (100 mg) by mouth daily    FRANKLYN (generalized anxiety disorder)  Stable on medication and doing well.   - desvenlafaxine (PRISTIQ) 100 MG 24 hr tablet; Take 1 tablet (100 mg) by mouth daily    Attention deficit hyperactivity disorder (ADHD), predominantly inattentive type  Stable on medication.   Will refill 20 mg dose when needed and plan to follow-up in 6 months.   She will request the immediate release 5 mg dose as needed since she doesn't use as frequently.    reviewed, no concerns.   - ADDERALL XR 20 MG 24 hr capsule; Take 1 capsule (20 mg) by mouth daily  - ADDERALL XR 20 MG 24 hr capsule; Take 1 capsule (20 mg) by mouth daily  - ADDERALL XR 20 MG 24 hr capsule; Take 1 capsule (20 mg) by mouth daily      Return in about 6 months (around 8/16/2022) for Medication Re-check.    Options for treatment and follow-up care were reviewed with the patient and/or guardian. Patient and/or guardian engaged in the decision making process and verbalized understanding of the options  discussed and agreed with the final plan.    Tiffanie Sequeira PA-C  [facility] ADRIA Saavedra is a 21 year old who presents for the following health issues     History of Present Illness       Keri Rai eats 2-3 servings of fruits and vegetables daily.Keri Rai consumes 1 sweetened beverage(s) daily.Keri Rai exercises with enough effort to increase Keri Rai's heart rate 10 to 19 minutes per day.  Keri Rai exercises with enough effort to increase Keri Rai's heart rate 3 or less days per week.   Keri Rai is taking medications regularly.     - Was recently prescribed azelastine again.     - Mental health has been doing very well.  Medication is working well. Doesn't feel like her anxiety has been severe.  College has been going well for her.   - The most significant thing in her life - got sick in November, tested multiple times for COVID and they were negative.  She was sick for several weeks.  She feels like her sinuses never got better after getting sick.  She has been having more pain in the sinuses.  That has increased since November.  Has constant feeling of being plugged up.  She scheduled an appointment with [facility], she has been referred to ENT for more thorough examination.  They prescribed her the Azelastine.    - Doing well with Adderall XR 20 mg once daily and uses the Adderall 5 mg daily as needed. Doing well on this, school is going well.   Review of Systems   Constitutional, HEENT, musculoskeletal, neuro, skin, and psych systems are negative, except as otherwise noted.      Objective           Vitals:  No vitals were obtained today due to virtual visit.    Physical Exam   GENERAL: Healthy, alert and no distress  EYES: Eyes grossly normal to inspection.  No discharge or erythema, or obvious scleral/conjunctival abnormalities.  RESP: No audible wheeze, cough, or visible cyanosis.  No visible retractions or increased work of  breathing.    SKIN: Visible skin clear. No significant rash, abnormal pigmentation or lesions.  NEURO: Cranial nerves grossly intact.  Mentation and speech appropriate for age.  PSYCH: Mentation appears normal, affect normal/bright, judgement and insight intact, normal speech and appearance well-groomed.        Video-Visit Details    Type of service:  Video Visit    Video End Time:3:20 PM    Originating Location (pt. Location): Home    Distant Location (provider location):  [facility]     Platform used for Video Visit: LTG Exam Prep Platform

## 2022-02-17 ENCOUNTER — TELEPHONE (OUTPATIENT)
Dept: FAMILY MEDICINE | Facility: OTHER | Age: 22
End: 2022-02-17
Payer: COMMERCIAL

## 2022-02-17 PROBLEM — S46.819A STRAIN OF TRAPEZIUS MUSCLE, UNSPECIFIED LATERALITY, INITIAL ENCOUNTER: Status: RESOLVED | Noted: 2017-01-30 | Resolved: 2017-05-22

## 2022-02-17 PROBLEM — K21.9 GASTROESOPHAGEAL REFLUX DISEASE: Status: RESOLVED | Noted: 2017-03-20 | Resolved: 2017-08-17

## 2022-02-17 ASSESSMENT — PATIENT HEALTH QUESTIONNAIRE - PHQ9: SUM OF ALL RESPONSES TO PHQ QUESTIONS 1-9: 6

## 2022-02-17 ASSESSMENT — ANXIETY QUESTIONNAIRES: GAD7 TOTAL SCORE: 4

## 2022-02-24 NOTE — TELEPHONE ENCOUNTER
Prior Authorization Approval    Authorization Effective Date: 1/25/2022  Authorization Expiration Date: 2/24/2023  Medication: desvenlafaxine (PRISTIQ) 100 MG 24 hr tablet  Approved Dose/Quantity:   Reference #: [id number]   Insurance Company: [employer] - Phone [phone] Fax [phone]  Which Pharmacy is filling the prescription (Not needed for infusion/clinic administered): [facility] [address] [place] [address]  Pharmacy Notified: Yes  Patient Notified: Yes

## 2022-02-24 NOTE — TELEPHONE ENCOUNTER
PA Initiation    Medication: desvenlafaxine (PRISTIQ) 100 MG 24 hr tablet  Insurance Company: [employer] - Phone [phone] Fax [phone]  Pharmacy Filling the Rx: [facility] [address] [place] [address]  Filling Pharmacy Phone: [phone]  Filling Pharmacy Fax: [phone]  Start Date: 2/24/2022

## 2022-03-30 ENCOUNTER — VIRTUAL VISIT (OUTPATIENT)
Dept: FAMILY MEDICINE | Facility: OTHER | Age: 22
End: 2022-03-30
Payer: COMMERCIAL

## 2022-03-30 DIAGNOSIS — F64.9 GENDER IDENTITY DISORDER: ICD-10-CM

## 2022-03-30 DIAGNOSIS — G43.019 INTRACTABLE MIGRAINE WITHOUT AURA AND WITHOUT STATUS MIGRAINOSUS: ICD-10-CM

## 2022-03-30 DIAGNOSIS — G47.00 INSOMNIA, UNSPECIFIED TYPE: Primary | ICD-10-CM

## 2022-03-30 PROCEDURE — 99214 OFFICE O/P EST MOD 30 MIN: CPT | Mod: 95 | Performed by: PHYSICIAN ASSISTANT

## 2022-03-30 RX ORDER — TRAZODONE HYDROCHLORIDE 50 MG/1
50-100 TABLET, FILM COATED ORAL AT BEDTIME
Qty: 60 TABLET | Refills: 0 | Status: SHIPPED | OUTPATIENT
Start: 2022-03-30 | End: 2022-05-03

## 2022-03-30 RX ORDER — RIZATRIPTAN BENZOATE 10 MG/1
10 TABLET ORAL
Qty: 10 TABLET | Refills: 1 | Status: SHIPPED | OUTPATIENT
Start: 2022-03-30 | End: 2023-05-08

## 2022-03-30 NOTE — PROGRESS NOTES
Keri is a 21 year old who is being evaluated via a billable video visit.      How would you like to obtain your AVS? MyChart  If the video visit is dropped, the invitation should be resent by: Text to cell phone: [phone]  Will anyone else be joining your video visit? No    Video Start Time: 2:35 PM    Assessment & Plan     Insomnia, unspecified type  Uncertain the recent trigger for worsening sleep but could be multifactorial. For now will treat the insomnia with Trazodone, had been on in 2018, no noted allergic reaction, Fatimah doesn't know of any specific reason it was discontinued.  We will try this first.  Consider mirtazepine, gabapentin, hydroxyzine, other sleep aids such as zolpidem, lunesta if necessary.  Fatimah is working on sleep hygiene as well.   - traZODone (DESYREL) 50 MG tablet; Take 1-2 tablets ( mg) by mouth At Bedtime    Intractable migraine without aura and without status migrainosus  Suspect increased migraines are due to lack of sleep and issues with sinuses recently.  Will continue to fill migraine medications, consider other medications that would offer potential improvement in sleep but migraines again such as a gabapentin. May be worth while to consider trying amitriptyline but with caution given reaction to nortriptyline in the past.   - rizatriptan (MAXALT) 10 MG tablet; Take 1 tablet (10 mg) by mouth at onset of headache for migraine May repeat in 2 hours. Max 3 tablets/24 hours.    Gender identity disorder  Identifies as nonbinary at this time.  Potentially interested in top surgery and counseling services.  Did reach out to the [facility] last winter and was told it was about a year wait, still hasn't heard.  Will place referral for the specific services requested to see if this is helpful   - Comprehensive Gender Care Referral; Future  - Comprehensive Gender Care Referral; Future      Return in about 4 weeks (around 4/27/2022) for Medication Re-check.    Options for treatment  and follow-up care were reviewed with the patient and/or guardian. Patient and/or guardian engaged in the decision making process and verbalized understanding of the options discussed and agreed with the final plan.'    Tiffanie Sequeira PA-C  [facility] ADRIA Saavedra is a 21 year old who presents for the following health issues    History of Present Illness       Reason for visit:  Increased difficulties in both falling and staying asleep recently.  Symptom onset:  3-4 weeks ago  Symptoms include:  Difficulties falling asleep, difficulties staying asleep (waking up in the middle of the night), fatigue/tired at abnormal hours  Symptom intensity:  Moderate  Symptom progression:  Staying the same  Had these symptoms before:  Yes  Has tried/received treatment for these symptoms:  No  What makes it better:  Caffeine when I wake up    Keri Rai eats 2-3 servings of fruits and vegetables daily.Keri Rai consumes 1 sweetened beverage(s) daily.Keri Rai exercises with enough effort to increase Keri Rai's heart rate 10 to 19 minutes per day.  Keri Rai exercises with enough effort to increase Keri Rai's heart rate 3 or less days per week.   Keri Rai is taking medications regularly.     Never has great sleep but the last month or two has been bad.   Will go to bed around 10 PM and can't get asleep until 4 AM, sometimes if she can get to sleep in a couple of hours she'll still wake up in the middle of the night.  She doesn't live with anyone who is disruptive.    She takes melatonin chronically about 10 mg and has gone up to 15 mg but it isn't working well.    She doesn't feel like she is getting good sleep.  Trying not to sleep during the daytime.    She is trying to avoid being in her bed unless she is ready to fall asleep.    She knows she could be better about not being on phone in bed.    Nothing specifically keeping her up at night.  Doesn't  feel like anything is causing the anxiety.   Normally when she takes the melatonin she can fall asleep within an hour.  Usually can fall asleep by midnight if a later night and gets up around 8 AM.  Normally a very heavy sleeper and isn't sensitive to light and noises.  Uses background noise to fall asleep.    Waking up regularly around 4 am recently for no reason.    Due to lack of sleep she is dealing with more migraines but also more migraines from her sinus issues she will be seeing ENT next week for.    Has been using her migraine medication more recently as well as excedrin.      Review of Systems   Constitutional, HEENT, cardiovascular, pulmonary, gi and gu systems are negative, except as otherwise noted.      Objective           Vitals:  No vitals were obtained today due to virtual visit.    Physical Exam   GENERAL: Healthy, alert and no distress  EYES: Eyes grossly normal to inspection.  No discharge or erythema, or obvious scleral/conjunctival abnormalities.  RESP: No audible wheeze, cough, or visible cyanosis.  No visible retractions or increased work of breathing.    SKIN: Visible skin clear. No significant rash, abnormal pigmentation or lesions.  NEURO: Cranial nerves grossly intact.  Mentation and speech appropriate for age.  PSYCH: Mentation appears normal, affect normal/bright, judgement and insight intact, normal speech and appearance well-groomed.      Video-Visit Details    Type of service:  Video Visit    Video End Time:2:59 PM    Originating Location (pt. Location): Home    Distant Location (provider location):  [facility]     Platform used for Video Visit: Markkit

## 2022-04-12 ENCOUNTER — MYC MEDICAL ADVICE (OUTPATIENT)
Dept: FAMILY MEDICINE | Facility: OTHER | Age: 22
End: 2022-04-12
Payer: COMMERCIAL

## 2022-05-20 NOTE — NURSING NOTE
The following nebulizer treatment was given:     MEDICATION: Duoneb  : [employer]  LOT #: [id number]  EXPIRATION DATE:  05/31/2019  NDC # 5179-2087-76     Nebulizer Start Time:  10:01am  Nebulizer Stop Time:  10:08am  See Vital Signs Flowsheet  Brittny Dotson MA       Niacinamide Pregnancy And Lactation Text: These medications are considered safe during pregnancy.

## 2022-09-16 DIAGNOSIS — G47.00 INSOMNIA, UNSPECIFIED TYPE: ICD-10-CM

## 2022-09-19 RX ORDER — TRAZODONE HYDROCHLORIDE 50 MG/1
TABLET, FILM COATED ORAL
Qty: 180 TABLET | Refills: 0 | Status: SHIPPED | OUTPATIENT
Start: 2022-09-19 | End: 2023-01-10

## 2022-10-12 ENCOUNTER — VIRTUAL VISIT (OUTPATIENT)
Dept: FAMILY MEDICINE | Facility: OTHER | Age: 22
End: 2022-10-12
Payer: COMMERCIAL

## 2022-10-12 DIAGNOSIS — F90.0 ATTENTION DEFICIT HYPERACTIVITY DISORDER (ADHD), PREDOMINANTLY INATTENTIVE TYPE: ICD-10-CM

## 2022-10-12 DIAGNOSIS — R40.0 DAYTIME SOMNOLENCE: ICD-10-CM

## 2022-10-12 DIAGNOSIS — E66.813 CLASS 3 SEVERE OBESITY DUE TO EXCESS CALORIES WITHOUT SERIOUS COMORBIDITY WITH BODY MASS INDEX (BMI) OF 50.0 TO 59.9 IN ADULT (H): ICD-10-CM

## 2022-10-12 DIAGNOSIS — R53.83 PERSISTENT FATIGUE AFTER COVID-19: ICD-10-CM

## 2022-10-12 DIAGNOSIS — G47.00 INSOMNIA, UNSPECIFIED TYPE: Primary | ICD-10-CM

## 2022-10-12 DIAGNOSIS — F41.1 GAD (GENERALIZED ANXIETY DISORDER): ICD-10-CM

## 2022-10-12 DIAGNOSIS — R06.83 SNORING: ICD-10-CM

## 2022-10-12 DIAGNOSIS — F33.2 SEVERE EPISODE OF RECURRENT MAJOR DEPRESSIVE DISORDER, WITHOUT PSYCHOTIC FEATURES (H): ICD-10-CM

## 2022-10-12 DIAGNOSIS — U09.9 PERSISTENT FATIGUE AFTER COVID-19: ICD-10-CM

## 2022-10-12 DIAGNOSIS — E66.01 CLASS 3 SEVERE OBESITY DUE TO EXCESS CALORIES WITHOUT SERIOUS COMORBIDITY WITH BODY MASS INDEX (BMI) OF 50.0 TO 59.9 IN ADULT (H): ICD-10-CM

## 2022-10-12 DIAGNOSIS — R53.83 OTHER FATIGUE: ICD-10-CM

## 2022-10-12 PROCEDURE — 99214 OFFICE O/P EST MOD 30 MIN: CPT | Mod: 95 | Performed by: PHYSICIAN ASSISTANT

## 2022-10-12 RX ORDER — DESVENLAFAXINE 100 MG/1
100 TABLET, EXTENDED RELEASE ORAL DAILY
Qty: 90 TABLET | Refills: 1 | Status: SHIPPED | OUTPATIENT
Start: 2022-10-12 | End: 2023-04-19

## 2022-10-12 RX ORDER — DEXTROAMPHETAMINE SACCHARATE, AMPHETAMINE ASPARTATE MONOHYDRATE, DEXTROAMPHETAMINE SULFATE AND AMPHETAMINE SULFATE 7.5; 7.5; 7.5; 7.5 MG/1; MG/1; MG/1; MG/1
30 CAPSULE, EXTENDED RELEASE ORAL DAILY
Qty: 30 CAPSULE | Refills: 0 | Status: SHIPPED | OUTPATIENT
Start: 2022-11-12 | End: 2022-12-12

## 2022-10-12 RX ORDER — DEXTROAMPHETAMINE SACCHARATE, AMPHETAMINE ASPARTATE MONOHYDRATE, DEXTROAMPHETAMINE SULFATE AND AMPHETAMINE SULFATE 7.5; 7.5; 7.5; 7.5 MG/1; MG/1; MG/1; MG/1
30 CAPSULE, EXTENDED RELEASE ORAL DAILY
Qty: 30 CAPSULE | Refills: 0 | Status: SHIPPED | OUTPATIENT
Start: 2022-10-12 | End: 2022-11-11

## 2022-10-12 RX ORDER — DEXTROAMPHETAMINE SACCHARATE, AMPHETAMINE ASPARTATE MONOHYDRATE, DEXTROAMPHETAMINE SULFATE AND AMPHETAMINE SULFATE 7.5; 7.5; 7.5; 7.5 MG/1; MG/1; MG/1; MG/1
30 CAPSULE, EXTENDED RELEASE ORAL DAILY
Qty: 30 CAPSULE | Refills: 0 | Status: SHIPPED | OUTPATIENT
Start: 2022-12-13 | End: 2023-01-11

## 2022-10-12 ASSESSMENT — ASTHMA QUESTIONNAIRES
QUESTION_2 LAST FOUR WEEKS HOW OFTEN HAVE YOU HAD SHORTNESS OF BREATH: ONCE OR TWICE A WEEK
ACT_TOTALSCORE: 23
QUESTION_1 LAST FOUR WEEKS HOW MUCH OF THE TIME DID YOUR ASTHMA KEEP YOU FROM GETTING AS MUCH DONE AT WORK, SCHOOL OR AT HOME: NONE OF THE TIME
QUESTION_5 LAST FOUR WEEKS HOW WOULD YOU RATE YOUR ASTHMA CONTROL: WELL CONTROLLED
QUESTION_3 LAST FOUR WEEKS HOW OFTEN DID YOUR ASTHMA SYMPTOMS (WHEEZING, COUGHING, SHORTNESS OF BREATH, CHEST TIGHTNESS OR PAIN) WAKE YOU UP AT NIGHT OR EARLIER THAN USUAL IN THE MORNING: NOT AT ALL
QUESTION_4 LAST FOUR WEEKS HOW OFTEN HAVE YOU USED YOUR RESCUE INHALER OR NEBULIZER MEDICATION (SUCH AS ALBUTEROL): NOT AT ALL
ACT_TOTALSCORE: 23

## 2022-10-12 ASSESSMENT — PATIENT HEALTH QUESTIONNAIRE - PHQ9
SUM OF ALL RESPONSES TO PHQ QUESTIONS 1-9: 9
SUM OF ALL RESPONSES TO PHQ QUESTIONS 1-9: 9
10. IF YOU CHECKED OFF ANY PROBLEMS, HOW DIFFICULT HAVE THESE PROBLEMS MADE IT FOR YOU TO DO YOUR WORK, TAKE CARE OF THINGS AT HOME, OR GET ALONG WITH OTHER PEOPLE: VERY DIFFICULT

## 2022-10-12 NOTE — PROGRESS NOTES
Keri is a 22 year old who is being evaluated via a billable video visit.      How would you like to obtain your AVS? MyChart  If the video visit is dropped, the invitation should be resent by: Text to cell phone: [phone]  Will anyone else be joining your video visit? No      Assessment & Plan      Insomnia, unspecified type  Persistent fatigue after COVID-19  Other fatigue  Class 3 severe obesity due to excess calories without serious comorbidity with body mass index (BMI) of 50.0 to 59.9 in adult (H)  This is likely multifactorial, see below but we will refer for sleep evaluation.  Suspect post COVID, possibly underlying ELKIN, history of depression, medications that can cause fatigue, being in college, history of issues with insomnia corrected with Trazodone could all be contributing.  We will see if we can get her the evaluation with Sleep Medicine and home study if appropriate, if not she will try to get back to the area for evaluation.  We will re order the labs we had ordered last year to try and get done at a local lab as well.   - Adult Sleep Eval & Management  Referral; Future  - TSH with free T4 reflex; Future  - CBC with platelets and differential; Future  - Iron and iron binding capacity; Future  - Ferritin; Future  - ESR: Erythrocyte sedimentation rate; Future  - CRP, inflammation; Future  - Rheumatoid factor; Future  - Anti Nuclear Mandi IgG by IFA with Reflex; Future  - Lyme Disease Total Abs Bld with Reflex to Confirm CLIA; Future    Daytime somnolence  Snoring  See above.   - Adult Sleep Eval & Management  Referral; Future    Severe episode of recurrent major depressive disorder, without psychotic features (H)  FRANKLYN (generalized anxiety disorder)  Stable for the most part.  Discussed lifestyle changes to help as well.   - desvenlafaxine (PRISTIQ) 100 MG 24 hr tablet; Take 1 tablet (100 mg) by mouth daily    Attention deficit hyperactivity disorder (ADHD), predominantly  inattentive type  Increasing dose of Adderall to 30 mg XR once daily from 20 mg XR once daily.   Will send 3 months, if tolerating update via [facility] and will send additional 3 months.   Monitor closely for sleep abnormalities or other side effects.    reviewed, no concerns.   - amphetamine-dextroamphetamine (ADDERALL XR) 30 MG 24 hr capsule; Take 1 capsule (30 mg) by mouth daily for 30 days  - amphetamine-dextroamphetamine (ADDERALL XR) 30 MG 24 hr capsule; Take 1 capsule (30 mg) by mouth daily for 30 days  - amphetamine-dextroamphetamine (ADDERALL XR) 30 MG 24 hr capsule; Take 1 capsule (30 mg) by mouth daily for 30 days            Return in about 6 months (around 4/12/2023) for Medication Re-check.    Options for treatment and follow-up care were reviewed with the patient and/or guardian. Patient and/or guardian engaged in the decision making process and verbalized understanding of the options discussed and agreed with the final plan.    Tiffanie Sequeira PA-C  [facility] is a 22 year old, presenting for the following health issues:  A.D.H.D      History of Present Illness       Reason for visit:  ADHD Medication Recheck Appt    Keri Rai eats 2-3 servings of fruits and vegetables daily.Keri Rai consumes 1 sweetened beverage(s) daily.Keri Rai exercises with enough effort to increase Keri Rai's heart rate 10 to 19 minutes per day.  Keri Rai exercises with enough effort to increase Keri Rai's heart rate 5 days per week.   Keri Rai is taking medications regularly.    Today's PHQ-9         PHQ-9 Total Score: 9    PHQ-9 Q9 Thoughts of better off dead/self-harm past 2 weeks :   Not at all    How difficult have these problems made it for you to do your work, take care of things at home, or get along with other people: Very difficult     Has been struggling with COVID since she had it last month. She is having a lot of issues  "with fatigue. The rest of the symptoms have resolved.    Did have surgery and had deviated septum repair, she also had turbinates reduced and had a nerve \"frozen\".  She did feel like it was helping but then developed COVID.      She does feel like even with her congestion with her recent allergies that she is still better off but still struggling.     Mental Health wise she feels \"ok\" \"well\" with everything that is going on.    She switched to Music major, she is still going to finish her chem minor.  She is thinking Music therapist in the future.    Has found that she needs to leave class early or sleep in at times due to the fatigue.  She is working to communicate with her professors as well. This makes her feel guilty as well.     Review of Systems   Constitutional, HEENT, cardiovascular, pulmonary, GI, , musculoskeletal, neuro, skin, endocrine and psych systems are negative, except as otherwise noted.      Objective           Vitals:  No vitals were obtained today due to virtual visit.    Physical Exam   GENERAL: alert and no distress  EYES: Eyes grossly normal to inspection.  No discharge or erythema, or obvious scleral/conjunctival abnormalities.  RESP: No audible wheeze, cough, or visible cyanosis.  No visible retractions or increased work of breathing.    SKIN: Visible skin clear. No significant rash, abnormal pigmentation or lesions.  NEURO: Cranial nerves grossly intact.  Mentation and speech appropriate for age.  PSYCH: Mentation appears normal, affect normal/bright, judgement and insight intact, normal speech and appearance well-groomed.        Video-Visit Details    Video Start Time: 3:14 PM    Type of service:  Video Visit    Video End Time:3:36 PM    Originating Location (pt. Location): Home    Distant Location (provider location):  [facility]     Platform used for Video Visit: Domenico"

## 2022-10-27 NOTE — PATIENT INSTRUCTIONS
Thank you for choosing [facility]. It was a pleasure to see you for your office visit today.     To reach our Specialty Clinic: [phone]  To reach our Imaging scheduler: [phone]      If you had any blood work, imaging or other tests:  Normal test results will be mailed to your home address in a letter  Abnormal results will be communicated to you via phone call/letter  Please allow up to 1-2 weeks for processing/interpretation of most lab work  If you have questions or concerns call our clinic at [phone]    Labs today: lyme disease testing, mono screen, thyroid function  Dr. Mckeon to find ecg to review  Dr. Mckeon will call after review of ecg/labs  Dizziness unlikely cardiac in nature, recommend increasing fluids and followup with primary care if persists    
no

## 2022-11-20 ENCOUNTER — HEALTH MAINTENANCE LETTER (OUTPATIENT)
Age: 22
End: 2022-11-20

## 2023-01-04 ENCOUNTER — TELEPHONE (OUTPATIENT)
Dept: PLASTIC SURGERY | Facility: CLINIC | Age: 23
End: 2023-01-04

## 2023-01-04 ENCOUNTER — MYC MEDICAL ADVICE (OUTPATIENT)
Dept: FAMILY MEDICINE | Facility: OTHER | Age: 23
End: 2023-01-04

## 2023-01-04 NOTE — CONFIDENTIAL NOTE
[facility] :  Care Coordination Note     SITUATION   Keri Rai (they/them) is a 22 year old adult who is receiving support for:  Care Team  .    BACKGROUND     Pt is scheduled for a gender affirming mastectomy consult with Dr. Blanco on 03/10/23.     ASSESSMENT     Surgery              C[facility] Assessment  [facility] [place] [place] En[place]ment: Enrolled  Patient has a therapist: Yes  Name of therapist: Tiffanie Sequeira (psychiatrist)  Letter of support #1: Requested  Surgery being considered: Yes  Mastectomy: Yes    Pt reports:   No nicotine  No other gender affirming surgeries      PLAN          Nursing Interventions:       Follow-up plan:  1. Obtain JASON Alexandra

## 2023-01-09 NOTE — TELEPHONE ENCOUNTER
Orders printed. Attempted to call patient, no answer. LMTCB. Need fax # and lab name of where patient would like orders sent.

## 2023-01-09 NOTE — TELEPHONE ENCOUNTER
Orders are placed already, [facility]    Tiffanie Sequeira PA-C

## 2023-01-10 DIAGNOSIS — G47.00 INSOMNIA, UNSPECIFIED TYPE: ICD-10-CM

## 2023-01-10 RX ORDER — TRAZODONE HYDROCHLORIDE 50 MG/1
TABLET, FILM COATED ORAL
Qty: 180 TABLET | Refills: 0 | Status: SHIPPED | OUTPATIENT
Start: 2023-01-10 | End: 2023-04-19

## 2023-01-10 NOTE — TELEPHONE ENCOUNTER
Pending Prescriptions:                       Disp   Refills    traZODone (DESYREL) 50 MG tablet [Pharmacy*180 ta*0        Sig: TAKE 1 TO 2 TABLETS BY MOUTH AT BEDTIME    Routing refill request to provider for review/approval because:  Drug interaction warning    traZODone (DESYREL) 50 MG tablet 180 tablet 0 9/19/2022  No   Sig: TAKE 1 TO 2 TABLETS BY MOUTH AT BEDTIME   Sent to pharmacy as: traZODone HCl 50 MG Oral Tablet (DESYREL)   Class: E-Prescribe   Order: [id number]   E-Prescribing Status: Receipt confirmed by pharmacy (9/19/2022 11:13 AM CDT)     Violeta Boyle RN on 1/10/2023 at 3:22 PM

## 2023-01-11 ENCOUNTER — MYC REFILL (OUTPATIENT)
Dept: FAMILY MEDICINE | Facility: OTHER | Age: 23
End: 2023-01-11

## 2023-01-11 DIAGNOSIS — F90.0 ATTENTION DEFICIT HYPERACTIVITY DISORDER (ADHD), PREDOMINANTLY INATTENTIVE TYPE: ICD-10-CM

## 2023-01-11 RX ORDER — DEXTROAMPHETAMINE SACCHARATE, AMPHETAMINE ASPARTATE MONOHYDRATE, DEXTROAMPHETAMINE SULFATE AND AMPHETAMINE SULFATE 7.5; 7.5; 7.5; 7.5 MG/1; MG/1; MG/1; MG/1
30 CAPSULE, EXTENDED RELEASE ORAL DAILY
Qty: 30 CAPSULE | Refills: 0 | Status: SHIPPED | OUTPATIENT
Start: 2023-01-11 | End: 2023-09-16

## 2023-01-13 ENCOUNTER — TELEPHONE (OUTPATIENT)
Dept: FAMILY MEDICINE | Facility: OTHER | Age: 23
End: 2023-01-13
Payer: COMMERCIAL

## 2023-01-13 NOTE — TELEPHONE ENCOUNTER
desvenlafaxine (PRISTIQ) 100 MG 24 hr tablet    Prior Authorization Retail Medication Request    Medication/Dose:   ICD code (if different than what is on RX):    Previously Tried and Failed:    Rationale:      Insurance Name:    Insurance ID:        Pharmacy Information (if different than what is on RX)  Name:    Phone:

## 2023-01-16 ENCOUNTER — VIRTUAL VISIT (OUTPATIENT)
Dept: FAMILY MEDICINE | Facility: OTHER | Age: 23
End: 2023-01-16
Payer: COMMERCIAL

## 2023-01-16 DIAGNOSIS — J45.20 MILD INTERMITTENT ASTHMA WITHOUT COMPLICATION: ICD-10-CM

## 2023-01-16 DIAGNOSIS — F90.0 ATTENTION DEFICIT HYPERACTIVITY DISORDER (ADHD), PREDOMINANTLY INATTENTIVE TYPE: Primary | ICD-10-CM

## 2023-01-16 DIAGNOSIS — F41.1 GAD (GENERALIZED ANXIETY DISORDER): ICD-10-CM

## 2023-01-16 DIAGNOSIS — F33.2 SEVERE EPISODE OF RECURRENT MAJOR DEPRESSIVE DISORDER, WITHOUT PSYCHOTIC FEATURES (H): ICD-10-CM

## 2023-01-16 PROCEDURE — 99213 OFFICE O/P EST LOW 20 MIN: CPT | Mod: 95 | Performed by: PHYSICIAN ASSISTANT

## 2023-01-16 ASSESSMENT — ANXIETY QUESTIONNAIRES
7. FEELING AFRAID AS IF SOMETHING AWFUL MIGHT HAPPEN: NOT AT ALL
8. IF YOU CHECKED OFF ANY PROBLEMS, HOW DIFFICULT HAVE THESE MADE IT FOR YOU TO DO YOUR WORK, TAKE CARE OF THINGS AT HOME, OR GET ALONG WITH OTHER PEOPLE?: SOMEWHAT DIFFICULT
3. WORRYING TOO MUCH ABOUT DIFFERENT THINGS: NOT AT ALL
6. BECOMING EASILY ANNOYED OR IRRITABLE: NOT AT ALL
GAD7 TOTAL SCORE: 4
4. TROUBLE RELAXING: MORE THAN HALF THE DAYS
2. NOT BEING ABLE TO STOP OR CONTROL WORRYING: NOT AT ALL
1. FEELING NERVOUS, ANXIOUS, OR ON EDGE: SEVERAL DAYS
GAD7 TOTAL SCORE: 4
GAD7 TOTAL SCORE: 4
7. FEELING AFRAID AS IF SOMETHING AWFUL MIGHT HAPPEN: NOT AT ALL
5. BEING SO RESTLESS THAT IT IS HARD TO SIT STILL: SEVERAL DAYS
IF YOU CHECKED OFF ANY PROBLEMS ON THIS QUESTIONNAIRE, HOW DIFFICULT HAVE THESE PROBLEMS MADE IT FOR YOU TO DO YOUR WORK, TAKE CARE OF THINGS AT HOME, OR GET ALONG WITH OTHER PEOPLE: SOMEWHAT DIFFICULT

## 2023-01-16 ASSESSMENT — PATIENT HEALTH QUESTIONNAIRE - PHQ9
SUM OF ALL RESPONSES TO PHQ QUESTIONS 1-9: 8
10. IF YOU CHECKED OFF ANY PROBLEMS, HOW DIFFICULT HAVE THESE PROBLEMS MADE IT FOR YOU TO DO YOUR WORK, TAKE CARE OF THINGS AT HOME, OR GET ALONG WITH OTHER PEOPLE: SOMEWHAT DIFFICULT
SUM OF ALL RESPONSES TO PHQ QUESTIONS 1-9: 8

## 2023-01-16 NOTE — PROGRESS NOTES
Keri is a 22 year old who is being evaluated via a billable video visit.      How would you like to obtain your AVS? MyChart  If the video visit is dropped, the invitation should be resent by: Text to cell phone: [phone]  Will anyone else be joining your video visit? No        Assessment & Plan     Attention deficit hyperactivity disorder (ADHD), predominantly inattentive type  They will check with pharmacy to see what they have comparable to the XR 30 mg and will send the doses. Unfortunately right now will have to do this probably monthly for now.    reviewed, no concerns.     FRANKLYN (generalized anxiety disorder)  Severe episode of recurrent major depressive disorder, without psychotic features (H)  Stable on Desvenalfaxine, waiting on the yearly PA process to go through.     Mild intermittent asthma without complication  Stable     Will get vitals updated at her consultation in March.     Return in about 6 months (around 7/16/2023) for Medication Re-check.    Options for treatment and follow-up care were reviewed with the patient and/or guardian. Patient and/or guardian engaged in the decision making process and verbalized understanding of the options discussed and agreed with the final plan.    Tiffanie Sequeira PA-C  [facility]   Keri is a 22 year old, presenting for the following health issues:  Recheck Medication      History of Present Illness       Mental Health Follow-up:  Patient presents to follow-up on Depression & Anxiety.Patient's depression since last visit has been:  No change  The patient is not having other symptoms associated with depression.  Patient's anxiety since last visit has been:  No change  The patient is not having other symptoms associated with anxiety.  Any significant life events: other  Patient is not feeling anxious or having panic attacks.  Patient has no concerns about alcohol or drug use.    Reason for visit:  Med recheck,  discussion about Adderall options due to med shortages    Keri Rai eats 2-3 servings of fruits and vegetables daily.Keri Rai consumes 1 sweetened beverage(s) daily.Keri Rai exercises with enough effort to increase Keri Rai's heart rate 9 or less minutes per day.  Keri Ria exercises with enough effort to increase Keri Rai's heart rate 3 or less days per week.   Keri Rai is taking medications regularly.    Today's PHQ-9         PHQ-9 Total Score: 8    PHQ-9 Q9 Thoughts of better off dead/self-harm past 2 weeks :   Not at all    How difficult have these problems made it for you to do your work, take care of things at home, or get along with other people: Somewhat difficult  Today's FRANKLYN-7 Score: 4     - Doing well.   - She feels like medication wise everything has been pretty good, no changes in their depression or anxiety.   - The increase in Adderall is helping more than the lower dose was.   - They tried to  their new Rx and there is a lot of trouble finding anywhere that is selling it.     Review of Systems   Constitutional, musculoskeletal, neuro, and psych systems are negative, except as otherwise noted.      Objective           Vitals:  No vitals were obtained today due to virtual visit.    Physical Exam   GENERAL: Healthy, alert and no distress  EYES: Eyes grossly normal to inspection.  No discharge or erythema, or obvious scleral/conjunctival abnormalities.  RESP: No audible wheeze, cough, or visible cyanosis.  No visible retractions or increased work of breathing.    SKIN: Visible skin clear. No significant rash, abnormal pigmentation or lesions.  NEURO: Cranial nerves grossly intact.  Mentation and speech appropriate for age.  PSYCH: Mentation appears normal, affect normal/bright, judgement and insight intact, normal speech and appearance well-groomed.        Video-Visit Details    Type of service:  Video Visit   Video Start Time: 10:14 AM  Video End  Time:10:30 AM    Originating Location (pt. Location): Home  Distant Location (provider location):  Off-site  Platform used for Video Visit: Domenico

## 2023-01-18 NOTE — TELEPHONE ENCOUNTER
PA Initiation    Medication: desvenlafaxine (PRISTIQ) 100 MG 24 hr tablet - PA INITIATED  Insurance Company: [employer] [place] - Phone [phone] Fax [phone]:  MISTY SOTO  Pharmacy Filling the Rx: [facility] [address] [place] [address]  Filling Pharmacy Phone: [phone]  Filling Pharmacy Fax:    Start Date: 1/18/2023

## 2023-01-18 NOTE — TELEPHONE ENCOUNTER
Central Prior Authorization Team   Phone: [phone]    Per pharmacy - Secondary insurance requires a PA    Prior Authorization Not Needed per Insurance    Medication: desvenlafaxine (PRISTIQ) 100 MG 24 hr tablet - PA NOT NEEDED  Insurance Company: Comment:  MISTY SOTO  Expected CoPay:      Pharmacy Filling the Rx: [facility] [address] [place] [address]  Pharmacy Notified: Yes - verified pharmacy received paid claim  Patient Notified: Yes

## 2023-01-19 NOTE — TELEPHONE ENCOUNTER
Prior Authorization Approval    Authorization Effective Date: 1/1/2023  Authorization Expiration Date: 1/18/2024  Medication: desvenlafaxine (PRISTIQ) 100 MG 24 hr tablet - PA APPROVED  Insurance Company: [employer] [place] - Phone [phone] Fax [phone]:  MISTY SOTO  Which Pharmacy is filling the prescription (Not needed for infusion/clinic administered): [facility] [address] [place] [address]  Pharmacy Notified: Yes  Patient Notified: Yes (patient already picked up)

## 2023-02-20 ENCOUNTER — MYC REFILL (OUTPATIENT)
Dept: FAMILY MEDICINE | Facility: OTHER | Age: 23
End: 2023-02-20
Payer: COMMERCIAL

## 2023-02-20 DIAGNOSIS — F90.0 ATTENTION DEFICIT HYPERACTIVITY DISORDER (ADHD), PREDOMINANTLY INATTENTIVE TYPE: ICD-10-CM

## 2023-02-20 RX ORDER — DEXTROAMPHETAMINE SULFATE, DEXTROAMPHETAMINE SACCHARATE, AMPHETAMINE SULFATE AND AMPHETAMINE ASPARTATE 5; 5; 5; 5 MG/1; MG/1; MG/1; MG/1
20 CAPSULE, EXTENDED RELEASE ORAL DAILY
Qty: 30 CAPSULE | Refills: 0 | Status: SHIPPED | OUTPATIENT
Start: 2023-02-20 | End: 2023-04-04

## 2023-02-20 RX ORDER — DEXTROAMPHETAMINE/AMPHETAMINE 10 MG
10 CAPSULE, EXT RELEASE 24 HR ORAL DAILY
Qty: 30 CAPSULE | Refills: 0 | Status: SHIPPED | OUTPATIENT
Start: 2023-02-20 | End: 2023-04-04

## 2023-03-10 ENCOUNTER — OFFICE VISIT (OUTPATIENT)
Dept: PLASTIC SURGERY | Facility: AMBULATORY SURGERY CENTER | Age: 23
End: 2023-03-10
Payer: COMMERCIAL

## 2023-03-10 VITALS
WEIGHT: 293 LBS | DIASTOLIC BLOOD PRESSURE: 77 MMHG | SYSTOLIC BLOOD PRESSURE: 117 MMHG | HEIGHT: 65 IN | BODY MASS INDEX: 48.82 KG/M2

## 2023-03-10 DIAGNOSIS — F64.9 GENDER DYSPHORIA: Primary | ICD-10-CM

## 2023-03-10 PROCEDURE — 99203 OFFICE O/P NEW LOW 30 MIN: CPT | Performed by: PLASTIC SURGERY

## 2023-03-10 NOTE — PROGRESS NOTES
March 10, 2023    Chief complaint:  Gender dysphoria, consultation for top surgery     History of present illness:  This is a 22 year old year old  patient, nonbinary, who comes today for consultation regarding top surgery.  Pronouns they/them.  Patient's name is Keri.  At this time the letter of support is pending.  They are not taking any testosterone.  Patient has been dealing with gender dysphoria for the last 7 years.     Past medical history:  Past Medical History:   Diagnosis Date     Bacterial pneumonia, unspecified 10/02    Hospitalized, [facility], wheezing     Depressive disorder 2015     Mild persistent asthma     Hospitalized 4/07       Gender dysphoria     Past surgical history:  Septoplasty     Allergies:  Penicillin, nortriptyline    Medications:    Current Outpatient Medications:      ADDERALL XR 10 MG 24 hr capsule, Take 1 capsule (10 mg) by mouth daily, Disp: 30 capsule, Rfl: 0     ADDERALL XR 20 MG 24 hr capsule, Take 1 capsule (20 mg) by mouth daily, Disp: 30 capsule, Rfl: 0     albuterol (PROAIR HFA/PROVENTIL HFA/VENTOLIN HFA) 108 (90 Base) MCG/ACT inhaler, Inhale 2 puffs into the lungs every 4 hours as needed for shortness of breath / dyspnea or wheezing, Disp: 18 g, Rfl: 4     albuterol (PROVENTIL) (2.5 MG/3ML) 0.083% neb solution, Take 1 vial (2.5 mg) by nebulization every 6 hours as needed for shortness of breath / dyspnea or wheezing, Disp: 90 mL, Rfl: 3     amphetamine-dextroamphetamine (ADDERALL XR) 30 MG 24 hr capsule, Take 1 capsule (30 mg) by mouth daily, Disp: 30 capsule, Rfl: 0     azelastine (ASTELIN) 0.1 % nasal spray, Spray 2 sprays into both nostrils 2 times daily, Disp: 1 Bottle, Rfl: 11     cetirizine (ZYRTEC) 10 MG tablet, Take 20 mg by mouth 2 times daily, Disp: , Rfl:      cholecalciferol (VITAMIN D3) 400 unit (10 mcg) TABS tablet, Take 400 Units by mouth daily, Disp: , Rfl:      desvenlafaxine (PRISTIQ) 100 MG 24 hr tablet, Take 1 tablet (100 mg) by mouth daily, Disp: 90  "tablet, Rfl: 1     etonogestrel (IMPLANON/NEXPLANON) 68 MG IMPL, 1 each (68 mg) by Subdermal route once, Disp: , Rfl:      melatonin 5 MG CAPS, , Disp: , Rfl:      rizatriptan (MAXALT) 10 MG tablet, Take 1 tablet (10 mg) by mouth at onset of headache for migraine May repeat in 2 hours. Max 3 tablets/24 hours., Disp: 10 tablet, Rfl: 1     traZODone (DESYREL) 50 MG tablet, TAKE 1 TO 2 TABLETS BY MOUTH AT BEDTIME, Disp: 180 tablet, Rfl: 0    Family history:  Father with history of lymphoma.     Social History:  Denies tobacco, drinks alcohol socially.     Review of systems:  General ROS: No complaints or constitutional symptoms  Skin: No complaints or symptoms   Hematologic/Lymphatic: No symptoms or complaints  Psychiatric: Patient presents with gender dysphoria  Endocrine: No excessive fatigue, no hypermetabolic symptoms reported  Respiratory ROS: No cough, shortness of breath, or wheezing  Cardiovascular ROS: No chest pain or dyspnea on exertion  Breast ROS: Denies nipple discharge, denies palpable breast masses, denies peau d'orange.  Gastrointestinal ROS: No abdominal pain, nausea, diarrhea, or constipation  Musculoskeletal ROS: No recent injuries reported  Neurological ROS: No focal neurologic defects reported.       Physical exam:    /77 (BP Location: Right arm, Patient Position: Sitting)   Ht 1.651 m (5' 5\")   Wt (!) 158.8 kg (350 lb)   BMI 58.24 kg/m    General: Alert, cooperative, appears stated age   Skin: Skin color, texture, turgor normal, no rashes or lesions   Lymphatic: No obvious adenopathy, no swelling   Eyes: No scleral icterus, pupils equal  HENT: No traumatic injury to the head or face, no gross abnormalities  Lungs: Normal respiratory effort, breath sounds equal bilaterally  Heart: Regular rate and rhythm  Breasts:  Bilateral grade 3 ptosis, no obvious nipple inversion or nipple discharge, no obvious palpable breast masses.  No peau d'orange.  No palpable axillary lymphadenopathies " "bilaterally.  Abdomen: Soft, non-distended and non-tender to palpation  Neurologic: Grossly intact                 Assessment:     22 year old years old patient with history of gender dysphoria.     PLAN:      I believe that Keri is a good candidate for gender affirming top surgery with one of the following options: Bilateral simple mastectomies versus bilateral breast reduction with possible nipple graft reconstruction.  Patient desires nipple reconstruction with nipple grafting bilaterally.     \"According to [place] case law and [facility] standards of care, with an appropriate letter of support from a mental health provider, top surgery/mastectomy is medically necessary for the treatment of gender dysphoria.\"     I discussed with Keri the risks of surgery and they include but are not limited to scarring, keloid formation, infection, bleeding, hematoma, seroma, contour deformities, lack of sensation on the chest, loss of nipple areolar graft requiring future tattooing, hypopigmentation of the nipple grafting with potential need for future tattooing.     Patient did acknowledge all of these risks and has agreed to proceed.     We will obtain a letter of support from his therapist and then we will schedule for surgery: bilateral simple mastectomies with bilateral nipple grafting.     Time spent with the patient 30 minutes.    Tito Blanco MD , FACS   Diplomate American Board of Plastic Surgery  Diplomate American Board of Surgery  Adj. Assistant Professor of Surgery  Division of Plastic & Reconstructive Surgery   [facility]  Office: [phone]   3/10/2023 at 2:09 PM     "

## 2023-03-10 NOTE — LETTER
3/10/2023         RE: Tami Rai  [address]  [address]  [facility] [address]        Dear Colleague,    Thank you for referring your patient, Tami Rai, to the [facility] [place]. Please see a copy of my visit note below.    March 10, 2023    Chief complaint:  Gender dysphoria, consultation for top surgery     History of present illness:  This is a 22 year old year old  patient, nonbinary, who comes today for consultation regarding top surgery.  Pronouns they/them.  Patient's name is Keri.  At this time the letter of support is pending.  They are not taking any testosterone.  Patient has been dealing with gender dysphoria for the last 7 years.     Past medical history:  Past Medical History:   Diagnosis Date     Bacterial pneumonia, unspecified 10/02    Hospitalized, [facility], wheezing     Depressive disorder 2015     Mild persistent asthma     Hospitalized 4/07       Gender dysphoria     Past surgical history:  Septoplasty     Allergies:  Penicillin, nortriptyline    Medications:    Current Outpatient Medications:      ADDERALL XR 10 MG 24 hr capsule, Take 1 capsule (10 mg) by mouth daily, Disp: 30 capsule, Rfl: 0     ADDERALL XR 20 MG 24 hr capsule, Take 1 capsule (20 mg) by mouth daily, Disp: 30 capsule, Rfl: 0     albuterol (PROAIR HFA/PROVENTIL HFA/VENTOLIN HFA) 108 (90 Base) MCG/ACT inhaler, Inhale 2 puffs into the lungs every 4 hours as needed for shortness of breath / dyspnea or wheezing, Disp: 18 g, Rfl: 4     albuterol (PROVENTIL) (2.5 MG/3ML) 0.083% neb solution, Take 1 vial (2.5 mg) by nebulization every 6 hours as needed for shortness of breath / dyspnea or wheezing, Disp: 90 mL, Rfl: 3     amphetamine-dextroamphetamine (ADDERALL XR) 30 MG 24 hr capsule, Take 1 capsule (30 mg) by mouth daily, Disp: 30 capsule, Rfl: 0     azelastine (ASTELIN) 0.1 % nasal spray, Spray 2 sprays into both nostrils 2 times daily, Disp: 1 Bottle, Rfl: 11     cetirizine (ZYRTEC) 10 MG  "tablet, Take 20 mg by mouth 2 times daily, Disp: , Rfl:      cholecalciferol (VITAMIN D3) 400 unit (10 mcg) TABS tablet, Take 400 Units by mouth daily, Disp: , Rfl:      desvenlafaxine (PRISTIQ) 100 MG 24 hr tablet, Take 1 tablet (100 mg) by mouth daily, Disp: 90 tablet, Rfl: 1     etonogestrel (IMPLANON/NEXPLANON) 68 MG IMPL, 1 each (68 mg) by Subdermal route once, Disp: , Rfl:      melatonin 5 MG CAPS, , Disp: , Rfl:      rizatriptan (MAXALT) 10 MG tablet, Take 1 tablet (10 mg) by mouth at onset of headache for migraine May repeat in 2 hours. Max 3 tablets/24 hours., Disp: 10 tablet, Rfl: 1     traZODone (DESYREL) 50 MG tablet, TAKE 1 TO 2 TABLETS BY MOUTH AT BEDTIME, Disp: 180 tablet, Rfl: 0    Family history:  Father with history of lymphoma.     Social History:  Denies tobacco, drinks alcohol socially.     Review of systems:  General ROS: No complaints or constitutional symptoms  Skin: No complaints or symptoms   Hematologic/Lymphatic: No symptoms or complaints  Psychiatric: Patient presents with gender dysphoria  Endocrine: No excessive fatigue, no hypermetabolic symptoms reported  Respiratory ROS: No cough, shortness of breath, or wheezing  Cardiovascular ROS: No chest pain or dyspnea on exertion  Breast ROS: Denies nipple discharge, denies palpable breast masses, denies peau d'orange.  Gastrointestinal ROS: No abdominal pain, nausea, diarrhea, or constipation  Musculoskeletal ROS: No recent injuries reported  Neurological ROS: No focal neurologic defects reported.       Physical exam:    /77 (BP Location: Right arm, Patient Position: Sitting)   Ht 1.651 m (5' 5\")   Wt (!) 158.8 kg (350 lb)   BMI 58.24 kg/m    General: Alert, cooperative, appears stated age   Skin: Skin color, texture, turgor normal, no rashes or lesions   Lymphatic: No obvious adenopathy, no swelling   Eyes: No scleral icterus, pupils equal  HENT: No traumatic injury to the head or face, no gross abnormalities  Lungs: Normal " "respiratory effort, breath sounds equal bilaterally  Heart: Regular rate and rhythm  Breasts:  Bilateral grade 3 ptosis, no obvious nipple inversion or nipple discharge, no obvious palpable breast masses.  No peau d'orange.  No palpable axillary lymphadenopathies bilaterally.  Abdomen: Soft, non-distended and non-tender to palpation  Neurologic: Grossly intact                 Assessment:     22 year old years old patient with history of gender dysphoria.     PLAN:      I believe that Keri is a good candidate for gender affirming top surgery with one of the following options: Bilateral simple mastectomies versus bilateral breast reduction with possible nipple graft reconstruction.  Patient desires nipple reconstruction with nipple grafting bilaterally.     \"According to [place] case law and [facility] standards of care, with an appropriate letter of support from a mental health provider, top surgery/mastectomy is medically necessary for the treatment of gender dysphoria.\"     I discussed with Keri the risks of surgery and they include but are not limited to scarring, keloid formation, infection, bleeding, hematoma, seroma, contour deformities, lack of sensation on the chest, loss of nipple areolar graft requiring future tattooing, hypopigmentation of the nipple grafting with potential need for future tattooing.     Patient did acknowledge all of these risks and has agreed to proceed.     We will obtain a letter of support from his therapist and then we will schedule for surgery: bilateral simple mastectomies with bilateral nipple grafting.     Time spent with the patient 30 minutes.    Tito Blanco MD , FACS   Diplomate American Board of Plastic Surgery  Diplomate American Board of Surgery  Adj. Assistant Professor of Surgery  Division of Plastic & Reconstructive Surgery   [facility]  Office: [phone]   3/10/2023 at 2:09 PM         Again, thank you for allowing me to participate in " the care of your patient.        Sincerely,        Tito Balnco MD

## 2023-03-10 NOTE — NURSING NOTE
Patient here today for top surgery consultation. Patient does not yet have a letter of support. Letter of Support resources sent to patient via [employer]. The patient has been encouraged to send letter of support once available to our team via [employer].     Mile Garner RN on 3/10/2023 at 2:01 PM

## 2023-04-04 ENCOUNTER — MYC REFILL (OUTPATIENT)
Dept: FAMILY MEDICINE | Facility: OTHER | Age: 23
End: 2023-04-04
Payer: COMMERCIAL

## 2023-04-04 DIAGNOSIS — F90.0 ATTENTION DEFICIT HYPERACTIVITY DISORDER (ADHD), PREDOMINANTLY INATTENTIVE TYPE: ICD-10-CM

## 2023-04-05 RX ORDER — DEXTROAMPHETAMINE/AMPHETAMINE 10 MG
10 CAPSULE, EXT RELEASE 24 HR ORAL DAILY
Qty: 30 CAPSULE | Refills: 0 | Status: SHIPPED | OUTPATIENT
Start: 2023-04-05 | End: 2023-05-06

## 2023-04-05 RX ORDER — DEXTROAMPHETAMINE SULFATE, DEXTROAMPHETAMINE SACCHARATE, AMPHETAMINE SULFATE AND AMPHETAMINE ASPARTATE 5; 5; 5; 5 MG/1; MG/1; MG/1; MG/1
20 CAPSULE, EXTENDED RELEASE ORAL DAILY
Qty: 30 CAPSULE | Refills: 0 | Status: SHIPPED | OUTPATIENT
Start: 2023-04-05 | End: 2023-05-06

## 2023-04-06 ENCOUNTER — TELEPHONE (OUTPATIENT)
Dept: FAMILY MEDICINE | Facility: OTHER | Age: 23
End: 2023-04-06
Payer: COMMERCIAL

## 2023-04-06 NOTE — TELEPHONE ENCOUNTER
ADDERALL XR 10 MG 24 hr capsule    Prior Authorization Retail Medication Request    Medication/Dose: ADDERALL XR 10 MG 24 hr capsule  ICD code (if different than what is on RX):    Previously Tried and Failed:    Rationale:      Insurance Name:    Insurance ID:        Pharmacy Information (if different than what is on RX)  Name:    Phone:

## 2023-04-06 NOTE — TELEPHONE ENCOUNTER
ADDERALL XR 20 MG 24 hr capsule      Prior Authorization Retail Medication Request    Medication/Dose: ADDERALL XR 20 MG 24 hr capsule  ICD code (if different than what is on RX):    Previously Tried and Failed:    Rationale:      Insurance Name:    Insurance ID:        Pharmacy Information (if different than what is on RX)  Name:    Phone:

## 2023-04-11 NOTE — TELEPHONE ENCOUNTER
PA Initiation    Medication: ADDERALL XR 10 MG 24 hr capsule - Initiated  Insurance Company: Other (see comments)Comment:  [facility]  Pharmacy Filling the Rx: [facility] [address] [place] [address]  Filling Pharmacy Phone: [phone]  Filling Pharmacy Fax: [phone]  Start Date: 4/11/2023

## 2023-04-11 NOTE — TELEPHONE ENCOUNTER
PA Initiation    Medication: ADDERALL XR 20 MG 24 hr capsule - Initiated  Insurance Company: Other (see comments)Comment:  [facility]  Pharmacy Filling the Rx: [facility] [address] [place] [address]  Filling Pharmacy Phone: [phone]  Filling Pharmacy Fax: [phone]  Start Date: 4/11/2023

## 2023-04-12 NOTE — TELEPHONE ENCOUNTER
Prior Authorization Approval    Authorization Effective Date: 4/12/2023  Authorization Expiration Date: 4/11/2024  Medication: ADDERALL XR 20 MG 24 hr capsule - Approved  Approved Dose/Quantity: 30/30ds  Reference #: [id number]   Insurance Company: Other (see comments)Comment:  [employer]   Which Pharmacy is filling the prescription (Not needed for infusion/clinic administered): [facility] [address] [place] [address]  Pharmacy Notified: Yes  Patient Notified: Yes

## 2023-04-12 NOTE — TELEPHONE ENCOUNTER
Prior Authorization Approval    Authorization Effective Date: 4/12/2023  Authorization Expiration Date: 4/11/2024  Medication: ADDERALL XR 10 MG 24 hr capsule - Approved  Approved Dose/Quantity: 30/30ds  Reference #: [id number]   Insurance Company: Other (see comments)Comment:  [employer]  Which Pharmacy is filling the prescription (Not needed for infusion/clinic administered): [facility] [address] [place] [address]  Pharmacy Notified: Yes  Patient Notified: Yes

## 2023-04-15 ENCOUNTER — HEALTH MAINTENANCE LETTER (OUTPATIENT)
Age: 23
End: 2023-04-15

## 2023-04-19 DIAGNOSIS — G47.00 INSOMNIA, UNSPECIFIED TYPE: ICD-10-CM

## 2023-04-19 DIAGNOSIS — F33.2 SEVERE EPISODE OF RECURRENT MAJOR DEPRESSIVE DISORDER, WITHOUT PSYCHOTIC FEATURES (H): ICD-10-CM

## 2023-04-19 DIAGNOSIS — F41.1 GAD (GENERALIZED ANXIETY DISORDER): ICD-10-CM

## 2023-04-19 RX ORDER — TRAZODONE HYDROCHLORIDE 50 MG/1
TABLET, FILM COATED ORAL
Qty: 180 TABLET | Refills: 0 | Status: SHIPPED | OUTPATIENT
Start: 2023-04-19 | End: 2023-07-28

## 2023-04-19 RX ORDER — DESVENLAFAXINE 100 MG/1
TABLET, EXTENDED RELEASE ORAL
Qty: 90 TABLET | Refills: 0 | Status: SHIPPED | OUTPATIENT
Start: 2023-04-19 | End: 2023-06-29

## 2023-04-19 NOTE — PROGRESS NOTES
Does Tami have a CPAP/Bipap?  No     [place] Sleep Scale: 10  STOP BAN  BMI: 58.24    Keri is a 22 year old who is being evaluated via a billable video visit.      How would you like to obtain your AVS? MyChart  If the video visit is dropped, the invitation should be resent by: Text to cell phone: [phone]  Will anyone else be joining your video visit? No              Subjective   Keri is a 22 year old, presenting for the following health issues:  Sleep Problem (Insomnia )          Objective           Vitals:  No vitals were obtained today due to virtual visit.    Physical Exam   GENERAL: healthy, alert, no distress and obese  EYES: Eyes grossly normal to inspection.  No discharge or erythema, or obvious scleral/conjunctival abnormalities.  RESP: No audible wheeze, cough, or visible cyanosis.  No visible retractions or increased work of breathing.    SKIN: Visible skin clear. No significant rash, abnormal pigmentation or lesions.  NEURO: Cranial nerves grossly intact.  Mentation and speech appropriate for age.  PSYCH: Mentation appears normal, affect normal/bright, judgement and insight intact, normal speech and appearance well-groomed.            Video-Visit Details    Type of service:  Video Visit   Video Start Time: 11:11 AM  Video End Time:11:44 AM    Originating Location (pt. Location): Home    Distant Location (provider location):  On-site  Platform used for Video Visit: [facility]    Outpatient Sleep Medicine Consultation:      Name: Tami Rai MRN# [id number]   Age: 22 year old YOB: 2000     Date of Consultation: 2023  Consultation is requested by: Tiffanie Sequeira PA-C  [address] [place] [id number] Tiffanie Sequeira  Primary care provider: Tiffanie Sequeira       Reason for Sleep Consult:     Tami Rai is sent by Tiffanie Sequeira for a sleep consultation regarding snoring, apneas, difficulty falling asleep and taying asleep at times. .    Patient s Reason for visit  Tami  HOLLEY Rai main reason for visit: Difficulty falling and staying asleep when not medicated, routinely need 9-10 hours of sleep, at less than 7, ability to function is severely impacted. Troubles with snoring waking up unable to breathe. Problems waking up in night and too early.  Patient states problem(s) started: I have had sleep issues to varying degrees since I was about 11, so 12 years.  Tami Rai's goals for this visit: Discuss solutions and options to help get better, more healthy sleep.           Assessment and Plan:     Summary Sleep Diagnoses:  Suspected sleep apnea/possible obesity hypoventilation syndrome- snoring, witnessed apneas,  Morning headaches, and a BMI of 58.   Insomnia- sleep onset, sleep maintenance.     Comorbid Diagnoses:  ADHD  Asthma  Anxiety/depression  Anemia      Summary Recommendations:  Lab study with TCM monitoring for suspected sleep apnea, hypoventilation. Will further address Insomnia on follow up.   No orders of the defined types were placed in this encounter.        Summary Counseling:    Sleep Testing Reviewed  Obstructive Sleep Apnea Reviewed  Complications of Untreated Sleep Apnea Reviewed      Medical Decision-making:   Educational materials provided in instructions    Total time spent reviewing medical records, history and physical examination, review of previous testing and interpretation as well as documentation on this date:45 min    CC: Tiffanie Sequeira          History of Present Illness:     Past Sleep Evaluations:    SLEEP-WAKE SCHEDULE:     Work/School Days: Patient goes to school/work: Yes   Usually gets into bed at 10:30-11:45pm  Takes patient about Depending on the day, 15 mins to multiple hours. to fall asleep  Has trouble falling asleep 1 nights per week  Wakes up in the middle of the night 1 times.  Wakes up due to Use the bathroom;Uncertain;Other  Keri BABB Fredi has trouble falling back asleep 1 -I have challenges falling back asleep almost every time I wake  up in the night. times a week.   It usually takes At least an hour usually, but anywhere from 15mins-3+hours. to get back to sleep  Patient is usually up at 8:30-9:30am  Uses alarm: Yes    Weekends/Non-work Days/All Other Days:  Usually gets into bed at 10pm-12am   Takes patient about Same as weekdays to fall asleep  Patient is usually up at 8:30am-10am  Uses alarm: Yes    Sleep Need  Patient gets  8-9 hours, occassionally 7-8 hours. sleep on average   Patient thinks Keri Rai needs about Ideally, at least 9. Some days I feel like I need more, but I never feel like I need less. sleep    Tami Rai prefers to sleep in this position(s): Side;Stomach   Patient states they do the following activities in bed: Read;Eat;Use phone, computer, or tablet    Naps  Patient takes a purposeful nap 1 times a week and naps are usually 1-4hes in duration  Keri Rai feels better after a nap: Yes  Keri Rai dozes off unintentionally 0 days per week  Patient has had a driving accident or near-miss due to sleepiness/drowsiness: No      SLEEP DISRUPTIONS:    Breathing/Snoring  Patient snores:Yes  Other people complain about Keri Rai's snoring: No  Patient has been told Keri Rai stops breathing in Keri Rai's sleep:Yes  Keri Rai has issues with the following: Morning headaches;Stuffy nose when you wake up    Movement:  Patient gets pain, discomfort, with an urge to move:  Yes  It happens when Keri Rai is resting:  Yes  It happens more at night:  Yes  Patient has been told Keri Rai kicks Keri Rai's legs at night:  No     Behaviors in Sleep:  Tami Rai has experienced the following behaviors while sleeping: Recurring Nightmares  Keri Rai has experienced sudden muscle weakness during the day: No      Is there anything else you would like your sleep provider to know: I also really struggle waking up. When I feel well rested, it is not as dofficult for me to get  out of bed and get started with my day, but I have difficulty waking up at least 2 days a week.        CAFFEINE AND OTHER SUBSTANCES:    Patient consumes caffeinated beverages per day:  Maximum of one, usually about 4 days a week max.  Last caffeine use is usually: ~4pm  List of any prescribed or over the counter stimulants that patient takes: Adderall XR, Adderall  List of any prescribed or over the counter sleep medication patient takes: Trazodone  List of previous sleep medications that patient has tried: N/A  Patient drinks alcohol to help them sleep: No  Patient drinks alcohol near bedtime: No    Family History:  Patient has a family member been diagnosed with a sleep disorder: No            SCALES:    EPWORTH SLEEPINESS SCALE         4/23/2023    11:02 PM    [place] Sleepiness Scale ( NIKKI Caro  2612-5405<br>ESS - [place]/English - Final version - 21 Nov 07 - [facility].)   Sitting and reading Slight chance of dozing   Watching TV Slight chance of dozing   Sitting, inactive in a public place (e.g. a theatre or a meeting) Slight chance of dozing   As a passenger in a car for an hour without a break High chance of dozing   Lying down to rest in the afternoon when circumstances permit High chance of dozing   Sitting and talking to someone Would never doze   Sitting quietly after a lunch without alcohol Slight chance of dozing   In a car, while stopped for a few minutes in traffic Would never doze   [place] Score (MC) 10   [place] Score (Sleep) 10         INSOMNIA SEVERITY INDEX (MIGUELANGEL)          4/23/2023    10:41 PM   Insomnia Severity Index (MIGUELANGEL)   Difficulty falling asleep 1   Difficulty staying asleep 1   Problems waking up too early 3   How SATISFIED/DISSATISFIED are you with your CURRENT sleep pattern? 3   How NOTICEABLE to others do you think your sleep problem is in terms of impairing the quality of your life? 2   How WORRIED/DISTRESSED are you about your current sleep problem? 4   To what extent  do you consider your sleep problem to INTERFERE with your daily functioning (e.g. daytime fatigue, mood, ability to function at work/daily chores, concentration, memory, mood, etc.) CURRENTLY? 3   MIGUELANGEL Total Score 17       Guidelines for Scoring/Interpretation:  Total score categories:  0-7 = No clinically significant insomnia   8-14 = Subthreshold insomnia   15-21 = Clinical insomnia (moderate severity)  22-28 = Clinical insomnia (severe)  Used via courtesy of www.[place].gov with permission from Omer Bello PhD., [facility]      STOP BANG         4/23/2023    11:03 PM   STOP BANG Questionnaire (  2008, the American Society of Anesthesiologists, Inc. [employer]   1. Snoring - Do you snore loudly (louder than talking or loud enough to be heard through closed doors)? Yes   2. Tired - Do you often feel tired, fatigued, or sleepy during daytime? Yes   3. Observed - Has anyone observed you stop breathing during your sleep? Yes   4. Blood pressure - Do you have or are you being treated for high blood pressure? No   5. BMI - BMI more than 35 kg/m2? Yes   6. Age - Age over 50 yr old? No   7. Neck circumference - Neck circumference greater than 40 cm? Yes   8. Gender - Gender male? No   STOP BANG Score (MC): 6 (High risk of ELKIN)         GAD7        1/16/2023     9:17 AM   FRANKLYN-7    1. Feeling nervous, anxious, or on edge 1   2. Not being able to stop or control worrying 0   3. Worrying too much about different things 0   4. Trouble relaxing 2   5. Being so restless that it is hard to sit still 1   6. Becoming easily annoyed or irritable 0   7. Feeling afraid, as if something awful might happen 0   FRANKLYN-7 Total Score 4   If you checked any problems, how difficult have they made it for you to do your work, take care of things at home, or get along with other people? Somewhat difficult         CAGE-AID         View : No data to display.                CAGE-AID reprinted with permission  "from the [place] Medical Journal, ERIC Tavares. and KARINA Galindo, \"Conjoint screening questionnaires for alcohol and drug abuse\" [place] Medical Journal 94: 135-140, 1995.      PATIENT HEALTH QUESTIONNAIRE-9 (PHQ - 9)        1/16/2023     9:15 AM   PHQ-9 ([employer])   1.  Little interest or pleasure in doing things 1   2.  Feeling down, depressed, or hopeless 0   3.  Trouble falling or staying asleep, or sleeping too much 2   4.  Feeling tired or having little energy 3   5.  Poor appetite or overeating 1   6.  Feeling bad about yourself - or that you are a failure or have let yourself or your family down 0   7.  Trouble concentrating on things, such as reading the newspaper or watching television 1   8.  Moving or speaking so slowly that other people could have noticed. Or the opposite - being so fidgety or restless that you have been moving around a lot more than usual 0   9.  Thoughts that you would be better off dead, or of hurting yourself in some way 0   PHQ-9 Total Score 8   6.  Feeling bad about yourself 0   7.  Trouble concentrating 1   8.  Moving slowly or restless 0   9.  Suicidal or self-harm thoughts 0   1.  Little interest or pleasure in doing things Several days   2.  Feeling down, depressed, or hopeless Not at all   3.  Trouble falling or staying asleep, or sleeping too much More than half the days   4.  Feeling tired or having little energy Nearly every day   5.  Poor appetite or overeating Several days   6.  Feeling bad about yourself Not at all   7.  Trouble concentrating Several days   8.  Moving slowly or restless Not at all   9.  Suicidal or self-harm thoughts Not at all   PHQ-9 via [employer]GN ([employer])t TOTAL SCORE-----> 8 (Mild depression)   Difficulty at work, home, or with people Somewhat difficult       Developed by Kathryn Calvillo, Roz Burdick, Rishi Bermeo and colleagues, with an educational yolie from [employer] No permission required to reproduce, translate, display or " distribute.        Allergies:    Allergies   Allergen Reactions     Amoxicillin      Pamelor [Nortriptyline] Hives     Hives on both arms, itching on arms and across chest        Medications:    Current Outpatient Medications   Medication Sig Dispense Refill     ADDERALL XR 10 MG 24 hr capsule Take 1 capsule (10 mg) by mouth daily 30 capsule 0     ADDERALL XR 20 MG 24 hr capsule Take 1 capsule (20 mg) by mouth daily 30 capsule 0     albuterol (PROAIR HFA/PROVENTIL HFA/VENTOLIN HFA) 108 (90 Base) MCG/ACT inhaler Inhale 2 puffs into the lungs every 4 hours as needed for shortness of breath / dyspnea or wheezing 18 g 4     albuterol (PROVENTIL) (2.5 MG/3ML) 0.083% neb solution Take 1 vial (2.5 mg) by nebulization every 6 hours as needed for shortness of breath / dyspnea or wheezing 90 mL 3     amphetamine-dextroamphetamine (ADDERALL XR) 30 MG 24 hr capsule Take 1 capsule (30 mg) by mouth daily 30 capsule 0     azelastine (ASTELIN) 0.1 % nasal spray Spray 2 sprays into both nostrils 2 times daily 1 Bottle 11     cetirizine (ZYRTEC) 10 MG tablet Take 20 mg by mouth 2 times daily       cholecalciferol (VITAMIN D3) 400 unit (10 mcg) TABS tablet Take 400 Units by mouth daily       desvenlafaxine (PRISTIQ) 100 MG 24 hr tablet Take 1 tablet by mouth once daily 90 tablet 0     etonogestrel (IMPLANON/NEXPLANON) 68 MG IMPL 1 each (68 mg) by Subdermal route once       melatonin 5 MG CAPS        rizatriptan (MAXALT) 10 MG tablet Take 1 tablet (10 mg) by mouth at onset of headache for migraine May repeat in 2 hours. Max 3 tablets/24 hours. 10 tablet 1     traZODone (DESYREL) 50 MG tablet TAKE 1 TO 2 TABLETS BY MOUTH AT BEDTIME 180 tablet 0       Problem List:  Patient Active Problem List    Diagnosis Date Noted     Sinus pressure 06/01/2020     Priority: Medium     Hives 03/10/2020     Priority: Medium     Diarrhea, unspecified type 01/14/2020     Priority: Medium     Allergic rhinitis due to mold 01/14/2020     Priority: Medium      Recurrent infections 01/14/2020     Priority: Medium     Attention deficit hyperactivity disorder (ADHD), predominantly inattentive type 06/21/2019     Priority: Medium     Pain in both hands 05/01/2018     Priority: Medium     Radial styloid tenosynovitis 05/01/2018     Priority: Medium     Severe episode of recurrent major depressive disorder, without psychotic features (H) 03/23/2018     Priority: Medium     FRANKLYN (generalized anxiety disorder) 03/23/2018     Priority: Medium     Migraine without aura and without status migrainosus, not intractable 12/13/2017     Priority: Medium     Improved with topamax, d/c 4/18       Anemia, iron deficiency 08/24/2016     Priority: Medium     Vitamin D deficiency 07/27/2016     Priority: Medium     Seasonal allergies 09/03/2010     Priority: Medium     Fall and spring       Class 3 severe obesity due to excess calories without serious comorbidity with body mass index (BMI) of 50.0 to 59.9 in adult (H) 07/12/2007     Priority: Medium     Mild intermittent asthma without complication 12/27/2006     Priority: Medium        Past Medical/Surgical History:  Past Medical History:   Diagnosis Date     Bacterial pneumonia, unspecified 10/02    Hospitalized, R[facility], wheezing     Depressive disorder 2015     Mild persistent asthma     Hospitalized 4/07     Past Surgical History:   Procedure Laterality Date     Deviated septum repair  08/2022    Turbinates reduction, a nerve that was frozen in the left side       Social History:  Social History     Socioeconomic History     Marital status: Single     Spouse name: Not on file     Number of children: Not on file     Years of education: Not on file     Highest education level: Not on file   Occupational History     Not on file   Tobacco Use     Smoking status: Passive Smoke Exposure - Never Smoker     Smokeless tobacco: Never     Tobacco comments:     outside of home   Vaping Use     Vaping status: Never Used   Substance and Sexual Activity      Alcohol use: No     Alcohol/week: 0.0 standard drinks of alcohol     Drug use: No     Comment: no exposure     Sexual activity: Not Currently     Partners: Male     Birth control/protection: Implant   Other Topics Concern     Parent/sibling w/ CABG, MI or angioplasty before 65F 55M? No   Social History Narrative     Not on file     Social Determinants of Health     Financial Resource Strain: Not on file   Food Insecurity: Not on file   Transportation Needs: Not on file   Physical Activity: Not on file   Stress: Not on file   Social Connections: Not on file   Intimate Partner Violence: Not on file   Housing Stability: Not on file       Family History:  Family History   Problem Relation Age of Onset     Deep Vein Thrombosis (DVT) Mother         Unknown cause     Substance Abuse Father      Lymphoma Father         ??     Asthma Sister      Mental Illness Brother      Cardiovascular Maternal Grandfather         stent     Coronary Artery Disease Paternal Grandmother      Breast Cancer Paternal Grandmother      Other Cancer Paternal Grandfather      Obesity Other      Asthma No family hx of      Coronary Artery Disease No family hx of      Breast Cancer No family hx of      Other Cancer No family hx of      Anxiety Disorder No family hx of      Substance Abuse No family hx of      Thyroid Disease No family hx of        Review of Systems:  A complete review of systems reviewed by me is negative with the exeption of what has been mentioned in the history of present illness.  In the last TWO WEEKS have you experienced any of the following symptoms?  Fevers: No  Night Sweats: Yes  Weight Gain: No  Pain at Night: No  Double Vision: No  Changes in Vision: No  Difficulty Breathing through Nose: Yes  Sore Throat in Morning: Yes  Dry Mouth in the Morning: Yes  Shortness of Breath Lying Flat: No  Shortness of Breath With Activity: Yes  Awakening with Shortness of Breath: No  Increased Cough: Yes  Heart Racing at Night:  Yes  Swelling in Feet or Legs: No  Diarrhea at Night: Yes  Heartburn at Night: No  Urinating More than Once at Night: No  Losing Control of Urine at Night: No  Joint Pains at Night: No  Headaches in Morning: Yes  Weakness in Arms or Legs: No  Depressed Mood: Yes  Anxiety: Yes     Physical Examination:  Vitals: There were no vitals taken for this visit.  BMI= There is no height or weight on file to calculate BMI.                  Data: All pertinent previous laboratory data reviewed     Recent Labs   Lab Test 03/28/19  0806 11/16/18  1213    140   POTASSIUM 4.4 4.1   CHLORIDE 105 108   CO2 28 24   ANIONGAP 6 8   GLC 91 80   BUN 14 9   CR 0.67 0.71   SOMMER 8.8* 8.6*       Recent Labs   Lab Test 11/07/19  1207   WBC 7.5   RBC 4.33   HGB 11.0*   HCT 35.9   MCV 83   MCH 25.4*   MCHC 30.6*   RDW 15.2*          Recent Labs   Lab Test 03/28/19  0806   PROTTOTAL 7.3   ALBUMIN 3.4   BILITOTAL 0.4   ALKPHOS 102   AST 15   ALT 29       TSH (mU/L)   Date Value   11/16/2018 2.81   10/29/2018 4.02 (H)       No results found for: UAMP, UBARB, BENZODIAZEUR, UCANN, UCOC, OPIT, UPCP    Iron Saturation Index   Date/Time Value Ref Range Status   10/29/2018 09:30 AM 13 (L) 15 - 46 % Final     Ferritin   Date/Time Value Ref Range Status   11/07/2019 12:07 PM 7 (L) 12 - 150 ng/mL Final       No results found for: PH, PHARTERIAL, PO2, [id number], SAT, PCO2, HCO3, BASEEXCESS, JOSEPH, BEB    @LABRCNTIPR(phv:4,pco2v:4,po2v:4,hco3v:4,louis:4,o2per:4)@    Echocardiology: No results found for this or any previous visit (from the past 4320 hour(s)).    Chest x-ray: No results found for this or any previous visit from the past 365 days.      Chest CT: No results found for this or any previous visit from the past 365 days.      PFT: Most Recent [facility] Pulmonary Function Testing    No results found for: 20001  No results found for: 20002  No results found for: 20003  No results found for: 20015  No results found for: 20016  No results found  for: 20027  No results found for: 20028  No results found for: 20029  No results found for: 20079  No results found for: 20080  No results found for: 20081  No results found for: 20335  No results found for: 20105  No results found for: 20053  No results found for: 20054  No results found for: 20055      Lebron Moreno CMA 4/24/2023

## 2023-04-23 ASSESSMENT — SLEEP AND FATIGUE QUESTIONNAIRES
HOW LIKELY ARE YOU TO NOD OFF OR FALL ASLEEP WHILE SITTING AND READING: SLIGHT CHANCE OF DOZING
HOW LIKELY ARE YOU TO NOD OFF OR FALL ASLEEP WHILE SITTING QUIETLY AFTER LUNCH WITHOUT ALCOHOL: SLIGHT CHANCE OF DOZING
HOW LIKELY ARE YOU TO NOD OFF OR FALL ASLEEP WHILE SITTING INACTIVE IN A PUBLIC PLACE: SLIGHT CHANCE OF DOZING
HOW LIKELY ARE YOU TO NOD OFF OR FALL ASLEEP WHILE LYING DOWN TO REST IN THE AFTERNOON WHEN CIRCUMSTANCES PERMIT: HIGH CHANCE OF DOZING
HOW LIKELY ARE YOU TO NOD OFF OR FALL ASLEEP WHILE WATCHING TV: SLIGHT CHANCE OF DOZING
HOW LIKELY ARE YOU TO NOD OFF OR FALL ASLEEP WHEN YOU ARE A PASSENGER IN A CAR FOR AN HOUR WITHOUT A BREAK: HIGH CHANCE OF DOZING
HOW LIKELY ARE YOU TO NOD OFF OR FALL ASLEEP WHILE SITTING AND TALKING TO SOMEONE: WOULD NEVER DOZE
HOW LIKELY ARE YOU TO NOD OFF OR FALL ASLEEP IN A CAR, WHILE STOPPED FOR A FEW MINUTES IN TRAFFIC: WOULD NEVER DOZE

## 2023-04-24 ENCOUNTER — VIRTUAL VISIT (OUTPATIENT)
Dept: SLEEP MEDICINE | Facility: CLINIC | Age: 23
End: 2023-04-24
Attending: PHYSICIAN ASSISTANT
Payer: COMMERCIAL

## 2023-04-24 DIAGNOSIS — E66.2 OBESITY HYPOVENTILATION SYNDROME (H): ICD-10-CM

## 2023-04-24 DIAGNOSIS — R06.83 SNORING: ICD-10-CM

## 2023-04-24 DIAGNOSIS — R53.83 OTHER FATIGUE: ICD-10-CM

## 2023-04-24 DIAGNOSIS — R29.818 SUSPECTED SLEEP APNEA: Primary | ICD-10-CM

## 2023-04-24 DIAGNOSIS — R40.0 DAYTIME SOMNOLENCE: ICD-10-CM

## 2023-04-24 DIAGNOSIS — G47.00 INSOMNIA, UNSPECIFIED TYPE: ICD-10-CM

## 2023-04-24 PROCEDURE — 99203 OFFICE O/P NEW LOW 30 MIN: CPT | Mod: VID | Performed by: PHYSICIAN ASSISTANT

## 2023-04-24 NOTE — PATIENT INSTRUCTIONS
"      MY TREATMENT INFORMATION FOR SLEEP APNEA-  Tami Rai    DOCTOR : JAELYN Vidal-GAEL    Am I having a sleep study at a sleep center?  --->Due to normal delays, you will be contacted within 2-4 weeks to schedule    Am I having a home sleep study?  --->Watch the video for the device you are using:    -/drop off device-   https://www.KnowledgeTree.com/watch?v=yGGFBdELGhk    -Disposable device sent out require phone/computer application-   https://www.KnowledgeTree.com/watch?v=BCce_vbiwxE      Frequently asked questions:  1. What is Obstructive Sleep Apnea (ELKIN)? ELKIN is the most common type of sleep apnea. Apnea means, \"without breath.\"  Apnea is most often caused by narrowing or collapse of the upper airway as muscles relax during sleep.   Almost everyone has occasional apneas. Most people with sleep apnea have had brief interruptions at night frequently for many years.  The severity of sleep apnea is related to how frequent and severe the events are.   2. What are the consequences of ELKIN? Symptoms include: feeling sleepy during the day, snoring loudly, gasping or stopping of breathing, trouble sleeping, and occasionally morning headaches or heartburn at night.  Sleepiness can be serious and even increase the risk of falling asleep while driving. Other health consequences may include development of high blood pressure and other cardiovascular disease in persons who are susceptible. Untreated ELKIN  can contribute to heart disease, stroke and diabetes.   3. What are the treatment options? In most situations, sleep apnea is a lifelong disease that must be managed with daily therapy. Medications are not effective for sleep apnea and surgery is generally not considered until other therapies have been tried. Your treatment is your choice . Continuous Positive Airway (CPAP) works right away and is the therapy that is effective in nearly everyone. An oral device to hold your jaw forward is usually the next most reliable " option. Other options include postioning devices (to keep you off your back), weight loss, and surgery including a tongue pacing device. There is more detail about some of these options below.  4. Are my sleep studies covered by insurance? Although we will request verification of coverage, we advise you also check in advance of the study to ensure there is coverage.    Important tips for those choosing CPAP and similar devices   Know your equipment:  CPAP is continuous positive airway pressure that prevents obstructive sleep apnea by keeping the throat from collapsing while you are sleeping. In most cases, the device is  smart  and can slowly self-adjusts if your throat collapses and keeps a record every day of how well you are treated-this information is available to you and your care team.  BPAP is bilevel positive airway pressure that keeps your throat open and also assists each breath with a pressure boost to maintain adequate breathing.  Special kinds of BPAP are used in patients who have inadequate breathing from lung or heart disease. In most cases, the device is  smart  and can slowly self-adjusts to assist breathing. Like CPAP, the device keeps a record of how well you are treated.  Your mask is your connection to the device. You get to choose what feels most comfortable and the staff will help to make sure if fits. Here: are some examples of the different masks that are available:       Key points to remember on your journey with sleep apnea:  Sleep study.  PAP devices often need to be adjusted during a sleep study to show that they are effective and adjusted right.  Good tips to remember: Try wearing just the mask during a quiet time during the day so your body adapts to wearing it. A humidifier is recommended for comfort in most cases to prevent drying of your nose and throat. Allergy medication from your provider may help you if you are having nasal congestion.  Getting settled-in. It takes more than  one night for most of us to get used to wearing a mask. Try wearing just the mask during a quiet time during the day so your body adapts to wearing it. A humidifier is recommended for comfort in most cases. Our team will work with you carefully on the first day and will be in contact within 4 days and again at 2 and 4 weeks for advice and remote device adjustments. Your therapy is evaluated by the device each day.   Use it every night. The more you are able to sleep naturally for 7-8 hours, the more likely you will have good sleep and to prevent health risks or symptoms from sleep apnea. Even if you use it 4 hours it helps. Occasionally all of us are unable to use a medical therapy, in sleep apnea, it is not dangerous to miss one night.   Communicate. Call our skilled team on the number provided on the first day if your visit for problems that make it difficult to wear the device. Over 2 out of 3 patients can learn to wear the device long-term with help from our team. Remember to call our team or your sleep providers if you are unable to wear the device as we may have other solutions for those who cannot adapt to mask CPAP therapy. It is recommended that you sleep your sleep provider within the first 3 months and yearly after that if you are not having problems.   Use it for your health. We encourage use of CPAP masks during daytime quiet periods to allow your face and brain to adapt to the sensation of CPAP so that it will be a more natural sensation to awaken to at night or during naps. This can be very useful during the first few weeks or months of adapting to CPAP though it does not help medically to wear CPAP during wakefulness and  should not be used as a strategy just to meet guidelines.  Take care of your equipment. Make sure you clean your mask and tubing using directions every day and that your filter and mask are replaced as recommended or if they are not working.     BESIDES CPAP, WHAT OTHER THERAPIES  ARE THERE?    Positioning Device  Positioning devices are generally used when sleep apnea is mild and only occurs on your back.This example shows a pillow that straps around the waist. It may be appropriate for those whose sleep study shows milder sleep apnea that occurs primarily when lying flat on one's back. Preliminary studies have shown benefit but effectiveness at home may need to be verified by a home sleep test. These devices are generally not covered by medical insurance.  Examples of devices that maintain sleeping on the back to prevent snoring and mild sleep apnea.    Belt type body positioner  http://Appy Couple/    Electronic reminder  http://nightshifttherapy.com/            Oral Appliance  What is oral appliance therapy?  An oral appliance device fits on your teeth at night like a retainer used after having braces. The device is made by a specialized dentist and requires several visits over 1-2 months before a manufactured device is made to fit your teeth and is adjusted to prevent your sleep apnea. Once an oral device is working properly, snoring should be improved. A home sleep test may be recommended at that time if to determine whether the sleep apnea is adequately treated.       Some things to remember:  -Oral devices are often, but not always, covered by your medical insurance. Be sure to check with your insurance provider.   -If you are referred for oral therapy, you will be given a list of specialized dentists to consider or you may choose to visit the Web site of the American Academy of Dental Sleep Medicine  -Oral devices are less likely to work if you have severe sleep apnea or are extremely overweight.     More detailed information  An oral appliance is a small acrylic device that fits over the upper and lower teeth  (similar to a retainer or a mouth guard). This device slightly moves jaw forward, which moves the base of the tongue forward, opens the airway, improves breathing for  effective treat snoring and obstructive sleep apnea in perhaps 7 out of 10 people .  The best working devices are custom-made by a dental device  after a mold is made of the teeth 1, 2, 3.  When is an oral appliance indicated?  Oral appliance therapy is recommended as a first-line treatment for patients with primary snoring, mild sleep apnea, and for patients with moderate sleep apnea who prefer appliance therapy to use of CPAP4, 5. Severity of sleep apnea is determined by sleep testing and is based on the number of respiratory events per hour of sleep.   How successful is oral appliance therapy?  The success rate of oral appliance therapy in patients with mild sleep apnea is 75-80% while in patients with moderate sleep apnea it is 50-70%. The chance of success in patients with severe sleep apnea is 40-50%. The research also shows that oral appliances have a beneficial effect on the cardiovascular health of ELKIN patients at the same magnitude as CPAP therapy7.  Oral appliances should be a second-line treatment in cases of severe sleep apnea, but if not completely successful then a combination therapy utilizing CPAP plus oral appliance therapy may be effective. Oral appliances tend to be effective in a broad range of patients although studies show that the patients who have the highest success are females, younger patients, those with milder disease, and less severe obesity. 3, 6.   Finding a dentist that practices dental sleep medicine  Specific training is available through the American Academy of Dental Sleep Medicine for dentists interested in working in the field of sleep. To find a dentist who is educated in the field of sleep and the use of oral appliances, near you, visit the Web site of the American Academy of Dental Sleep Medicine.    References  1. Kristyn et al. Objectively measured vs self-reported compliance during oral appliance therapy for sleep-disordered breathing. Chest 2013; 144(5):  3927-3966.  2. Ora et al. Objective measurement of compliance during oral appliance therapy for sleep-disordered breathing. Thorax 2013; 68(1): 91-96.  3. Nitish et al. Mandibular advancement devices in 620 men and women with ELKIN and snoring: tolerability and predictors of treatment success. Chest 2004; 125: 9895-3970.  4. Brandon, et al. Oral appliances for snoring and ELKIN: a review. Sleep 2006; 29: 244-262.  5. Hernan et al. Oral appliance treatment for ELKIN: an update. J Clin Sleep Med 2014; 10(2): 215-227.  6. Beth et al. Predictors of OSAH treatment outcome. J Dent Res 2007; 86: 8076-4113.      Weight Loss:    Weight loss is a long-term strategy that may improve sleep apnea in some patients.    Weight management is a personal decision and the decision should be based on your interest and the potential benefits.  If you are interested in exploring weight loss strategies, the following discussion covers the impact on weight loss on sleep apnea and the approaches that may be successful.    Being overweight does not necessarily mean you will have health consequences.  Those who have BMI over 35 or over 27 with existing medical conditions carries greater risk.   Weight loss decreases severity of sleep apnea in most people with obesity. For those with mild obesity who have developed snoring with weight gain, even 15-30 pound weight loss can improve and occasionally eliminate sleep apnea.  Structured and life-long dietary and health habits are necessary to lose weight and keep healthier weight levels.     Though there may be significant health benefits from weight loss, long-term weight loss is very difficult to achieve- studies show success with dietary management in less than 10% of people. In addition, substantial weight loss may require years of dietary control and may be difficult if patients have severe obesity. In these cases, surgical management may be considered.  Finally, older  individuals who have tolerated obesity without health complications may be less likely to benefit from weight loss strategies.      [unfilled]    Surgery:    Surgery for obstructive sleep apnea is considered generally only when other therapies fail to work. Surgery may be discussed with you if you are having a difficult time tolerating CPAP and or when there is an abnormal structure that requires surgical correction.  Nose and throat surgeries often enlarge the airway to prevent collapse.  Most of these surgeries create pain for 1-2 weeks and up to half of the most common surgeries are not effective throughout life.  You should carefully discuss the benefits and drawbacks to surgery with your sleep provider and surgeon to determine if it is the best solution for you.   More information  Surgery for ELKIN is directed at areas that are responsible for narrowing or complete obstruction of the airway during sleep.  There are a wide range of procedures available to enlarge and/or stabilize the airway to prevent blockage of breathing in the three major areas where it can occur: the palate, tongue, and nasal regions.  Successful surgical treatment depends on the accurate identification of the factors responsible for obstructive sleep apnea in each person.  A personalized approach is required because there is no single treatment that works well for everyone.  Because of anatomic variation, consultation with an examination by a sleep surgeon is a critical first step in determining what surgical options are best for each patient.  In some cases, examination during sedation may be recommended in order to guide the selection of procedures.  Patients will be counseled about risks and benefits as well as the typical recovery course after surgery. Surgery is typically not a cure for a person s ELKIN.  However, surgery will often significantly improve one s ELKIN severity (termed  success rate ).  Even in the absence of a cure, surgery  will decrease the cardiovascular risk associated with OSA7; improve overall quality of life8 (sleepiness, functionality, sleep quality, etc).      Palate Procedures:  Patients with ELKIN often have narrowing of their airway in the region of their tonsils and uvula.  The goals of palate procedures are to widen the airway in this region as well as to help the tissues resist collapse.  Modern palate procedure techniques focus on tissue conservation and soft tissue rearrangement, rather than tissue removal.  Often the uvula is preserved in this procedure. Residual sleep apnea is common in patient after pharyngoplasty with an average reduction in sleep apnea events of 33%2.      Tongue Procedures:  ExamWhile patients are awake, the muscles that surround the throat are active and keep this region open for breathing. These muscles relax during sleep, allowing the tongue and other structures to collapse and block breathing.  There are several different tongue procedures available.  Selection of a tongue base procedure depends on characteristics seen on physical exam.  Generally, procedures are aimed at removing bulky tissues in this area or preventing the back of the tongue from falling back during sleep.  Success rates for tongue surgery range from 50-62%3.    Hypoglossal Nerve Stimulation:  Hypoglossal nerve stimulation has recently received approval from the [place] Food and Drug Administration for the treatment of obstructive sleep apnea.  This is based on research showing that the system was safe and effective in treating sleep apnea6.  Results showed that the median AHI score decreased 68%, from 29.3 to 9.0. This therapy uses an implant system that senses breathing patterns and delivers mild stimulation to airway muscles, which keeps the airway open during sleep.  The system consists of three fully implanted components: a small generator (similar in size to a pacemaker), a breathing sensor, and a stimulation lead.   Using a small handheld remote, a patient turns the therapy on before bed and off upon awakening.    Candidates for this device must be greater than 18 years of age, have moderate to severe ELKIN (AHI between 15-65), BMI less than 35, have tried CPAP/oral appliance for at least 8 weeks without success, and have appropriate upper airway anatomy (determined by a sleep endoscopy performed by Dr. Angel Kathleen).    Hypoglossal Nerve Stimulation Pathway:    The sleep surgeon s office will work with the patient through the insurance prior-authorization process (including communications and appeals).    Nasal Procedures:  Nasal obstruction can interfere with nasal breathing during the day and night.  Studies have shown that relief of nasal obstruction can improve the ability of some patients to tolerate positive airway pressure therapy for obstructive sleep apnea1.  Treatment options include medications such as nasal saline, topical corticosteroid and antihistamine sprays, and oral medications such as antihistamines or decongestants. Non-surgical treatments can include external nasal dilators for selected patients. If these are not successful by themselves, surgery can improve the nasal airway either alone or in combination with these other options.      Combination Procedures:  Combination of surgical procedures and other treatments may be recommended, particularly if patients have more than one area of narrowing or persistent positional disease.  The success rate of combination surgery ranges from 66-80%2,3.    References  Michel MAYO. The Role of the Nose in Snoring and Obstructive Sleep Apnoea: An Update.  Eur Arch Otorhinolaryngol. 2011; 268: 1365-73.   Juana SM; Saturnino JA; Libby JR; Pallanch JF; Mariela MB; Herminio SG; Melony SOUSA. Surgical modifications of the upper airway for obstructive sleep apnea in adults: a systematic review and meta-analysis. SLEEP 2010;33(10):6535-5368. Krystle LITTLE. Hypopharyngeal surgery in  obstructive sleep apnea: an evidence-based medicine review.  Arch Otolaryngol Head Neck Surg. 2006 Feb;132(2):206-13.  Mac YH1, Suzette Y, Thong JOSEMANUEL. The efficacy of anatomically based multilevel surgery for obstructive sleep apnea. Otolaryngol Head Neck Surg. 2003 Oct;129(4):327-35.  Krystle LITTLE, Goldberg A. Hypopharyngeal Surgery in Obstructive Sleep Apnea: An Evidence-Based Medicine Review. Arch Otolaryngol Head Neck Surg. 2006 Feb;132(2):206-13.  Denise PALOMO et al. Upper-Airway Stimulation for Obstructive Sleep Apnea.  N Engl J Med. 2014 Jan 9;370(2):139-49.  Leticia Y et al. Increased Incidence of Cardiovascular Disease in Middle-aged Men with Obstructive Sleep Apnea. Am J Respir Crit Care Med; 2002 166: 159-165  Shawjan JURADO et al. Studying Life Effects and Effectiveness of Palatopharyngoplasty (SLEEP) study: Subjective Outcomes of Isolated Uvulopalatopharyngoplasty. Otolaryngol Head Neck Surg. 2011; 144: 623-631.        WHAT IF I ONLY HAVE SNORING?    Mandibular advancement devices, lateral sleep positioning, long-term weight loss and treatment of nasal allergies have been shown to improve snoring.  Exercising tongue muscles with a game (https://[employer]/us/bernabe/soundly-reduce-snoring/yl3258698914) or stimulating the tongue during the day with a device (https://doi.org/10.3390/fny67680489) have improved snoring in some individuals.    Remember to Drive Safe... Drive Alive     Sleep health profoundly affects your health, mood, and your safety.  Thirty three percent of the population (one in three of us) is not getting enough sleep and many have a sleep disorder. Not getting enough sleep or having an untreated / undertreated sleep condition may make us sleepy without even knowing it. In fact, our driving could be dramatically impaired due to our sleep health. As your provider, here are some things I would like you to know about driving:     Here are some warning signs for impairment and dangerous drowsy driving:               -Having been awake more than 16 hours               -Looking tired               -Eyelid drooping              -Head nodding (it could be too late at this point)              -Driving for more than 30 minutes     Some things you could do to make the driving safer if you are experiencing some drowsiness:              -Stop driving and rest              -Call for transportation              -Make sure your sleep disorder is adequately treated     Some things that have been shown NOT to work when experiencing drowsiness while driving:              -Turning on the radio              -Opening windows              -Eating any  distracting  /  entertaining  foods (e.g., sunflower seeds, candy, or any other)              -Talking on the phone      Your decision may not only impact your life, but also the life of others. Please, remember to drive safe for yourself and all of us.         Insomnia - Relaxation  Stress, tension, and anxiety can be big factors contributing to insomnia.  If you can t relax your mind and body, then you won t be able to sleep.  You would likely benefit from trying some of the relaxation exercises that we ve assembled below.  These are all internet based links.  If you don t have or use a computer, you may benefit from one of the stress management books listed below that you can get at your public library or at a local bookstore for a relatively low price.      Progressive Muscle Relaxation (PMR):   http://www.Makeblock.[employer]/progressive-muscle-relaxation-exercise.html   http://studentsupport.[facility]/counseling/resources/self-help/relaxation-and-stress-management/   Deep Breathing Exercises:  http://www.[employer]/breathing-awareness.html     Meditation:   www.Pluribus Networksrantheart7fgame  www.the10BestThingsguided-meditation-site.com You may have to pay for some of these resources.    Guided Imagery:  http://www.[employer]/guided-imagery-scripts.html    http://[employer]/library/hiyhjxrpiy-ygogxn-qbcfbdb/

## 2023-05-06 ASSESSMENT — ASTHMA QUESTIONNAIRES
ACT_TOTALSCORE: 25
QUESTION_5 LAST FOUR WEEKS HOW WOULD YOU RATE YOUR ASTHMA CONTROL: COMPLETELY CONTROLLED
QUESTION_2 LAST FOUR WEEKS HOW OFTEN HAVE YOU HAD SHORTNESS OF BREATH: NOT AT ALL
QUESTION_4 LAST FOUR WEEKS HOW OFTEN HAVE YOU USED YOUR RESCUE INHALER OR NEBULIZER MEDICATION (SUCH AS ALBUTEROL): NOT AT ALL
QUESTION_3 LAST FOUR WEEKS HOW OFTEN DID YOUR ASTHMA SYMPTOMS (WHEEZING, COUGHING, SHORTNESS OF BREATH, CHEST TIGHTNESS OR PAIN) WAKE YOU UP AT NIGHT OR EARLIER THAN USUAL IN THE MORNING: NOT AT ALL
ACT_TOTALSCORE: 25
QUESTION_1 LAST FOUR WEEKS HOW MUCH OF THE TIME DID YOUR ASTHMA KEEP YOU FROM GETTING AS MUCH DONE AT WORK, SCHOOL OR AT HOME: NONE OF THE TIME

## 2023-05-08 ENCOUNTER — VIRTUAL VISIT (OUTPATIENT)
Dept: FAMILY MEDICINE | Facility: OTHER | Age: 23
End: 2023-05-08
Payer: COMMERCIAL

## 2023-05-08 DIAGNOSIS — M79.672 BILATERAL FOOT PAIN: ICD-10-CM

## 2023-05-08 DIAGNOSIS — M54.50 BILATERAL LOW BACK PAIN WITHOUT SCIATICA, UNSPECIFIED CHRONICITY: Primary | ICD-10-CM

## 2023-05-08 DIAGNOSIS — G43.019 INTRACTABLE MIGRAINE WITHOUT AURA AND WITHOUT STATUS MIGRAINOSUS: ICD-10-CM

## 2023-05-08 DIAGNOSIS — M79.671 BILATERAL FOOT PAIN: ICD-10-CM

## 2023-05-08 PROCEDURE — 99213 OFFICE O/P EST LOW 20 MIN: CPT | Mod: VID | Performed by: PHYSICIAN ASSISTANT

## 2023-05-08 RX ORDER — RIZATRIPTAN BENZOATE 10 MG/1
10 TABLET ORAL
Qty: 10 TABLET | Refills: 1 | Status: SHIPPED | OUTPATIENT
Start: 2023-05-08 | End: 2023-10-23

## 2023-05-08 ASSESSMENT — ANXIETY QUESTIONNAIRES
2. NOT BEING ABLE TO STOP OR CONTROL WORRYING: NOT AT ALL
6. BECOMING EASILY ANNOYED OR IRRITABLE: NOT AT ALL
5. BEING SO RESTLESS THAT IT IS HARD TO SIT STILL: SEVERAL DAYS
7. FEELING AFRAID AS IF SOMETHING AWFUL MIGHT HAPPEN: NOT AT ALL
1. FEELING NERVOUS, ANXIOUS, OR ON EDGE: NOT AT ALL
7. FEELING AFRAID AS IF SOMETHING AWFUL MIGHT HAPPEN: NOT AT ALL
GAD7 TOTAL SCORE: 2
8. IF YOU CHECKED OFF ANY PROBLEMS, HOW DIFFICULT HAVE THESE MADE IT FOR YOU TO DO YOUR WORK, TAKE CARE OF THINGS AT HOME, OR GET ALONG WITH OTHER PEOPLE?: NOT DIFFICULT AT ALL
IF YOU CHECKED OFF ANY PROBLEMS ON THIS QUESTIONNAIRE, HOW DIFFICULT HAVE THESE PROBLEMS MADE IT FOR YOU TO DO YOUR WORK, TAKE CARE OF THINGS AT HOME, OR GET ALONG WITH OTHER PEOPLE: NOT DIFFICULT AT ALL
3. WORRYING TOO MUCH ABOUT DIFFERENT THINGS: NOT AT ALL
GAD7 TOTAL SCORE: 2
4. TROUBLE RELAXING: SEVERAL DAYS
GAD7 TOTAL SCORE: 2

## 2023-05-08 ASSESSMENT — PATIENT HEALTH QUESTIONNAIRE - PHQ9
SUM OF ALL RESPONSES TO PHQ QUESTIONS 1-9: 6
10. IF YOU CHECKED OFF ANY PROBLEMS, HOW DIFFICULT HAVE THESE PROBLEMS MADE IT FOR YOU TO DO YOUR WORK, TAKE CARE OF THINGS AT HOME, OR GET ALONG WITH OTHER PEOPLE: SOMEWHAT DIFFICULT
SUM OF ALL RESPONSES TO PHQ QUESTIONS 1-9: 6

## 2023-05-08 NOTE — PROGRESS NOTES
Keri is a 22 year old who is being evaluated via a billable video visit.      How would you like to obtain your AVS? MyChart  If the video visit is dropped, the invitation should be resent by: Text to cell phone: [phone]  Will anyone else be joining your video visit? No        Assessment & Plan     Bilateral low back pain without sciatica, unspecified chronicity\  Bilateral foot pain  They are going to work on stretching, strengthening exercises at home to help reduce the pain related to prolonged standing to help build muscle strength and avoid muscle fatigue.  They will get shoes that are more supportive as well. Did write note for work in the meantime.     Already being seen and evaluated for their GI issues.     Options for treatment and follow-up care were reviewed with the patient and/or guardian. Patient and/or guardian engaged in the decision making process and verbalized understanding of the options discussed and agreed with the final plan.      Tiffanie Sequeira PA-C  [facility]   Keri is a 22 year old, presenting for the following health issues:  No chief complaint on file.         View : No data to display.              History of Present Illness       Back Pain:  Keri Rai presents for follow up of back pain. Patient's back pain is a recurring problem.  Location of back pain:  Right lower back, left lower back, right middle of back and left middle of back  Description of back pain: dull ache  Back pain spreads: nowhere    Since patient first noticed back pain, pain is: always present, but gets better and worse  Does back pain interfere with Keri Rai's job:  Yes      Keri Rai eats 2-3 servings of fruits and vegetables daily.Keri Rai consumes 1 sweetened beverage(s) daily.Keri Rai exercises with enough effort to increase Keri Rai's heart rate 10 to 19 minutes per day.  Keri HOLLEY Royon exercises with enough effort to  "increase Keri Rai's heart rate 3 or less days per week.   Keri Rai is taking medications regularly.    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   - they are working at [employer], dealing with more foot pain and back pain after 4 hours of work. Currently Cashiering   - They ordered shoes with more arch support and hoping that will help.   - Has been trying to do lower back stretching and foot stretching.   - Has been dealing with stomach issues - had blood in stool and then went to the ER a month ago, they ran some tests, it showed that they had inflammation in the colon, was then diagnosed with C diff, took antibiotics and was still positive so did another round of antibiotics and is doing \"fine\" but a couple of days ago they had blood in stool again, went to UC on Saturday and the C diff test was negative.  They have scheduled her with Colonoscopy but was pushed back due to the second round of antibiotics.  Has a hx of irregular bowel movements throughout time.  Gets diarrhea frequently.     Review of Systems   Constitutional, HEENT, cardiovascular, pulmonary, GI, , musculoskeletal, neuro, skin, endocrine and psych systems are negative, except as otherwise noted.      Objective           Vitals:  No vitals were obtained today due to virtual visit.    Physical Exam   GENERAL: Healthy, alert and no distress  EYES: Eyes grossly normal to inspection.  No discharge or erythema, or obvious scleral/conjunctival abnormalities.  RESP: No audible wheeze, cough, or visible cyanosis.  No visible retractions or increased work of breathing.    SKIN: Visible skin clear. No significant rash, " abnormal pigmentation or lesions.  NEURO: Cranial nerves grossly intact.  Mentation and speech appropriate for age.  PSYCH: Mentation appears normal, affect normal/bright, judgement and insight intact, normal speech and appearance well-groomed.          Video-Visit Details    Type of service:  Video Visit   Video Start Time: 10:33 AM  Video End Time:10:45 AM    Originating Location (pt. Location): Home  Distant Location (provider location):  Off-site  Platform used for Video Visit: Domen[employer]

## 2023-05-08 NOTE — LETTER
May 8, 2023      Tami Rai  [address] [place] [id number]      Please allow for Tami, AKA Keri, to have a stool present at their work station to allow them to sit as needed while working to help with her foot and back pain.  They will utilize it as needed.       Sincerely,        Tiffanie Sequeira PA-C

## 2023-05-24 ASSESSMENT — SLEEP AND FATIGUE QUESTIONNAIRES
HOW LIKELY ARE YOU TO NOD OFF OR FALL ASLEEP WHILE LYING DOWN TO REST IN THE AFTERNOON WHEN CIRCUMSTANCES PERMIT: HIGH CHANCE OF DOZING
HOW LIKELY ARE YOU TO NOD OFF OR FALL ASLEEP WHILE WATCHING TV: SLIGHT CHANCE OF DOZING
HOW LIKELY ARE YOU TO NOD OFF OR FALL ASLEEP WHILE SITTING INACTIVE IN A PUBLIC PLACE: SLIGHT CHANCE OF DOZING
HOW LIKELY ARE YOU TO NOD OFF OR FALL ASLEEP WHILE SITTING QUIETLY AFTER LUNCH WITHOUT ALCOHOL: SLIGHT CHANCE OF DOZING
HOW LIKELY ARE YOU TO NOD OFF OR FALL ASLEEP WHILE SITTING AND TALKING TO SOMEONE: WOULD NEVER DOZE
HOW LIKELY ARE YOU TO NOD OFF OR FALL ASLEEP WHILE SITTING AND READING: SLIGHT CHANCE OF DOZING
HOW LIKELY ARE YOU TO NOD OFF OR FALL ASLEEP WHEN YOU ARE A PASSENGER IN A CAR FOR AN HOUR WITHOUT A BREAK: HIGH CHANCE OF DOZING
HOW LIKELY ARE YOU TO NOD OFF OR FALL ASLEEP IN A CAR, WHILE STOPPED FOR A FEW MINUTES IN TRAFFIC: WOULD NEVER DOZE

## 2023-05-26 ENCOUNTER — LAB (OUTPATIENT)
Dept: LAB | Facility: CLINIC | Age: 23
End: 2023-05-26
Payer: COMMERCIAL

## 2023-05-26 ENCOUNTER — THERAPY VISIT (OUTPATIENT)
Dept: SLEEP MEDICINE | Facility: CLINIC | Age: 23
End: 2023-05-26
Attending: PHYSICIAN ASSISTANT
Payer: COMMERCIAL

## 2023-05-26 DIAGNOSIS — R29.818 SUSPECTED SLEEP APNEA: ICD-10-CM

## 2023-05-26 DIAGNOSIS — R06.83 SNORING: ICD-10-CM

## 2023-05-26 DIAGNOSIS — R40.0 DAYTIME SOMNOLENCE: ICD-10-CM

## 2023-05-26 DIAGNOSIS — G47.00 INSOMNIA, UNSPECIFIED TYPE: ICD-10-CM

## 2023-05-26 DIAGNOSIS — E66.2 OBESITY HYPOVENTILATION SYNDROME (H): ICD-10-CM

## 2023-05-26 LAB
ALLEN'S TEST: YES
BASE EXCESS BLDA CALC-SCNC: -0.3 MMOL/L (ref -9–1.8)
HCO3 BLD-SCNC: 24 MMOL/L (ref 21–28)
O2/TOTAL GAS SETTING VFR VENT: 21 %
OXYHGB MFR BLD: 96 % (ref 92–100)
PCO2 BLD: 39 MM HG (ref 35–45)
PH BLD: 7.4 [PH] (ref 7.35–7.45)
PO2 BLD: 91 MM HG (ref 80–105)

## 2023-05-26 PROCEDURE — 95810 POLYSOM 6/> YRS 4/> PARAM: CPT | Performed by: OTOLARYNGOLOGY

## 2023-05-26 PROCEDURE — 82805 BLOOD GASES W/O2 SATURATION: CPT

## 2023-06-19 ENCOUNTER — MYC REFILL (OUTPATIENT)
Dept: FAMILY MEDICINE | Facility: OTHER | Age: 23
End: 2023-06-19
Payer: COMMERCIAL

## 2023-06-19 DIAGNOSIS — F90.0 ATTENTION DEFICIT HYPERACTIVITY DISORDER (ADHD), PREDOMINANTLY INATTENTIVE TYPE: ICD-10-CM

## 2023-06-19 RX ORDER — DEXTROAMPHETAMINE/AMPHETAMINE 10 MG
10 CAPSULE, EXT RELEASE 24 HR ORAL DAILY
Qty: 30 CAPSULE | Refills: 0 | Status: SHIPPED | OUTPATIENT
Start: 2023-06-19 | End: 2023-08-17

## 2023-06-19 RX ORDER — DEXTROAMPHETAMINE SULFATE, DEXTROAMPHETAMINE SACCHARATE, AMPHETAMINE SULFATE AND AMPHETAMINE ASPARTATE 5; 5; 5; 5 MG/1; MG/1; MG/1; MG/1
20 CAPSULE, EXTENDED RELEASE ORAL DAILY
Qty: 30 CAPSULE | Refills: 0 | Status: SHIPPED | OUTPATIENT
Start: 2023-06-19 | End: 2023-08-17

## 2023-06-28 LAB — SLPCOMP: NORMAL

## 2023-06-29 DIAGNOSIS — F33.2 SEVERE EPISODE OF RECURRENT MAJOR DEPRESSIVE DISORDER, WITHOUT PSYCHOTIC FEATURES (H): ICD-10-CM

## 2023-06-29 DIAGNOSIS — F41.1 GAD (GENERALIZED ANXIETY DISORDER): ICD-10-CM

## 2023-06-29 RX ORDER — DESVENLAFAXINE 100 MG/1
TABLET, EXTENDED RELEASE ORAL
Qty: 90 TABLET | Refills: 0 | Status: SHIPPED | OUTPATIENT
Start: 2023-06-29 | End: 2023-12-18

## 2023-07-09 ENCOUNTER — E-VISIT (OUTPATIENT)
Dept: FAMILY MEDICINE | Facility: OTHER | Age: 23
End: 2023-07-09
Payer: COMMERCIAL

## 2023-07-09 DIAGNOSIS — R51.9 ACUTE NONINTRACTABLE HEADACHE, UNSPECIFIED HEADACHE TYPE: Primary | ICD-10-CM

## 2023-07-09 PROCEDURE — 99207 PR NON-BILLABLE SERV PER CHARTING: CPT | Performed by: PHYSICIAN ASSISTANT

## 2023-07-09 NOTE — LETTER
July 10, 2023      Tami Rai  [address]    [place] [id number]        To Whom It May Concern:    Please excuse Keri Rai 07/09/23 due to illness.        Sincerely,        Tiffanie Sequeira PA-C

## 2023-07-28 DIAGNOSIS — G47.00 INSOMNIA, UNSPECIFIED TYPE: ICD-10-CM

## 2023-07-28 RX ORDER — TRAZODONE HYDROCHLORIDE 50 MG/1
TABLET, FILM COATED ORAL
Qty: 180 TABLET | Refills: 0 | Status: SHIPPED | OUTPATIENT
Start: 2023-07-28 | End: 2023-11-13

## 2023-07-28 NOTE — TELEPHONE ENCOUNTER
"Pending Prescriptions:                       Disp   Refills    traZODone (DESYREL) 50 MG tablet [Pharmacy*180 ta*0        Sig: TAKE 1 TO 2 TABLETS BY MOUTH AT BEDTIME    Routing refill request to provider for review/approval because:  Drug interaction warning    PHQ-9 score:        5/8/2023    10:29 AM   PHQ   PHQ-9 Total Score 6   Q9: Thoughts of better off dead/self-harm past 2 weeks Not at all         Requested Prescriptions   Pending Prescriptions Disp Refills    traZODone (DESYREL) 50 MG tablet [Pharmacy Med Name: traZODone HCl 50 MG Oral Tablet] 180 tablet 0     Sig: TAKE 1 TO 2 TABLETS BY MOUTH AT BEDTIME       Serotonin Modulators Passed - 7/28/2023 12:36 PM        Passed - Recent (12 mo) or future (30 days) visit within the authorizing provider's specialty     Patient has had an office visit with the authorizing provider or a provider within the authorizing providers department within the previous 12 mos or has a future within next 30 days. See \"Patient Info\" tab in inbasket, or \"Choose Columns\" in Meds & Orders section of the refill encounter.              Passed - Medication is active on med list        Passed - Patient is age 18 or older        Passed - No active pregnancy on record        Passed - No positive pregnancy test in past 12 months                   "

## 2023-08-19 ASSESSMENT — SLEEP AND FATIGUE QUESTIONNAIRES
HOW LIKELY ARE YOU TO NOD OFF OR FALL ASLEEP WHILE SITTING INACTIVE IN A PUBLIC PLACE: SLIGHT CHANCE OF DOZING
HOW LIKELY ARE YOU TO NOD OFF OR FALL ASLEEP WHILE SITTING AND READING: WOULD NEVER DOZE
HOW LIKELY ARE YOU TO NOD OFF OR FALL ASLEEP WHEN YOU ARE A PASSENGER IN A CAR FOR AN HOUR WITHOUT A BREAK: HIGH CHANCE OF DOZING
HOW LIKELY ARE YOU TO NOD OFF OR FALL ASLEEP WHILE WATCHING TV: HIGH CHANCE OF DOZING
HOW LIKELY ARE YOU TO NOD OFF OR FALL ASLEEP WHILE LYING DOWN TO REST IN THE AFTERNOON WHEN CIRCUMSTANCES PERMIT: HIGH CHANCE OF DOZING
HOW LIKELY ARE YOU TO NOD OFF OR FALL ASLEEP WHILE SITTING AND TALKING TO SOMEONE: WOULD NEVER DOZE
HOW LIKELY ARE YOU TO NOD OFF OR FALL ASLEEP WHILE SITTING QUIETLY AFTER LUNCH WITHOUT ALCOHOL: SLIGHT CHANCE OF DOZING
HOW LIKELY ARE YOU TO NOD OFF OR FALL ASLEEP IN A CAR, WHILE STOPPED FOR A FEW MINUTES IN TRAFFIC: WOULD NEVER DOZE

## 2023-08-23 NOTE — PROGRESS NOTES
Does Tami have a CPAP/Bipap?  No   [place]do Sleep Scale:  11      Keri is a 23 year old who is being evaluated via a billable video visit.      How would you like to obtain your AVS? MyChart  If the video visit is dropped, the invitation should be resent by: Text to cell phone: [phone]  Will anyone else be joining your video visit? No              Subjective   Keri is a 23 year old, presenting for the following health issues:  Study Results          Objective           Vitals:  No vitals were obtained today due to virtual visit.    Physical Exam   GENERAL: healthy, alert, no distress, and over weight  EYES: Eyes grossly normal to inspection.  No discharge or erythema, or obvious scleral/conjunctival abnormalities.  RESP: No audible wheeze, cough, or visible cyanosis.  No visible retractions or increased work of breathing.    SKIN: Visible skin clear. No significant rash, abnormal pigmentation or lesions.  NEURO: Cranial nerves grossly intact.  Mentation and speech appropriate for age.  PSYCH: Mentation appears normal, affect normal/bright, judgement and insight intact, normal speech and appearance well-groomed.                Video-Visit Details    Type of service:  Video Visit   Video Start Time:  1015  Video End Time:10:41 AM    Originating Location (pt. Location): Home    Distant Location (provider location):  On-site  Platform used for Video Visit: [facility]   Sleep Study Follow-Up Visit:    Date on this visit: 8/25/2023    Tami Rai comes in today for follow-up of Keri Rai's sleep study done on 5/26/23 at the [facility] for excessive daytime sleepiness and possible sleep apnea.    Sleep latency 2.9 minutes with trazodone and melatonin.  REM not achieved.   REM latency - minutes.  Sleep efficiency 88.5%. Total sleep time 367 minutes.    Sleep architecture:  Stage 1, 1.2% (5%), stage 2, 68.8% (45-55%), stage 3, 30% (15-20%), stage REM, 0% (20-25%).  AHI was 12.9,  without desaturations. RDI 17.3.  REM RDI -, consistent with no REM sleep  Supine RDI 30, consistent with severe SUPINE ELKIN.  Periodic Limb Movement Index 39.7/hour.         These findings were reviewed with patient.     Past medical/surgical history, family history, social history, medications and allergies were reviewed.      Problem List:  Patient Active Problem List    Diagnosis Date Noted    Sinus pressure 06/01/2020     Priority: Medium    Hives 03/10/2020     Priority: Medium    Diarrhea, unspecified type 01/14/2020     Priority: Medium    Allergic rhinitis due to mold 01/14/2020     Priority: Medium    Recurrent infections 01/14/2020     Priority: Medium    Attention deficit hyperactivity disorder (ADHD), predominantly inattentive type 06/21/2019     Priority: Medium    Pain in both hands 05/01/2018     Priority: Medium    Radial styloid tenosynovitis 05/01/2018     Priority: Medium    Severe episode of recurrent major depressive disorder, without psychotic features (H) 03/23/2018     Priority: Medium    FRANKLYN (generalized anxiety disorder) 03/23/2018     Priority: Medium    Migraine without aura and without status migrainosus, not intractable 12/13/2017     Priority: Medium     Improved with topamax, d/c 4/18      Anemia, iron deficiency 08/24/2016     Priority: Medium    Vitamin D deficiency 07/27/2016     Priority: Medium    Seasonal allergies 09/03/2010     Priority: Medium     Fall and spring      Class 3 severe obesity due to excess calories without serious comorbidity with body mass index (BMI) of 50.0 to 59.9 in adult (H) 07/12/2007     Priority: Medium    Mild intermittent asthma without complication 12/27/2006     Priority: Medium        Impression/Plan:    Mild sleep apnea- no REM sleep obtained. Patient would like to start CPAP. Orders placed for APAP 5-12 CMH2O. Sleep onset and maintenance insomnia- referral placed for sleep psychology.   Keri Rai will follow up with me in about 3 month(s).      20 minutes spent with patient, all of which were spent face-to-face counseling, consulting, coordinating plan of care.          CC: Tiffanie Sequeira

## 2023-08-25 ENCOUNTER — VIRTUAL VISIT (OUTPATIENT)
Dept: SLEEP MEDICINE | Facility: CLINIC | Age: 23
End: 2023-08-25
Payer: COMMERCIAL

## 2023-08-25 DIAGNOSIS — G47.33 OSA (OBSTRUCTIVE SLEEP APNEA): Primary | ICD-10-CM

## 2023-08-25 DIAGNOSIS — F51.04 PSYCHOPHYSIOLOGIC INSOMNIA: ICD-10-CM

## 2023-08-25 PROCEDURE — 99214 OFFICE O/P EST MOD 30 MIN: CPT | Mod: VID | Performed by: PHYSICIAN ASSISTANT

## 2023-09-06 NOTE — NURSING NOTE
Prior to neb verified patient identity using patient's name and date of birth.      The following nebulizer treatment was given:     MEDICATION: Duoneb  : Cater to u  LOT #: [id number]  EXPIRATION DATE:  5/2019  NDC # 3630-9126-92     Nebulizer Start Time:  242pm  Nebulizer Stop Time:  248pm  See Vital Signs Flowsheet      Batool Coronado CMA ([facility])       not applicable (Male)

## 2023-09-16 ENCOUNTER — MYC REFILL (OUTPATIENT)
Dept: FAMILY MEDICINE | Facility: OTHER | Age: 23
End: 2023-09-16
Payer: COMMERCIAL

## 2023-09-16 DIAGNOSIS — F90.0 ATTENTION DEFICIT HYPERACTIVITY DISORDER (ADHD), PREDOMINANTLY INATTENTIVE TYPE: ICD-10-CM

## 2023-09-18 RX ORDER — DEXTROAMPHETAMINE SACCHARATE, AMPHETAMINE ASPARTATE MONOHYDRATE, DEXTROAMPHETAMINE SULFATE AND AMPHETAMINE SULFATE 7.5; 7.5; 7.5; 7.5 MG/1; MG/1; MG/1; MG/1
30 CAPSULE, EXTENDED RELEASE ORAL DAILY
Qty: 30 CAPSULE | Refills: 0 | Status: SHIPPED | OUTPATIENT
Start: 2023-09-18 | End: 2023-11-11

## 2023-09-22 ENCOUNTER — LAB (OUTPATIENT)
Dept: LAB | Facility: CLINIC | Age: 23
End: 2023-09-22
Payer: COMMERCIAL

## 2023-09-22 ENCOUNTER — DOCUMENTATION ONLY (OUTPATIENT)
Dept: SLEEP MEDICINE | Facility: CLINIC | Age: 23
End: 2023-09-22
Payer: COMMERCIAL

## 2023-09-22 DIAGNOSIS — G47.33 OSA (OBSTRUCTIVE SLEEP APNEA): Primary | ICD-10-CM

## 2023-09-22 DIAGNOSIS — R53.83 OTHER FATIGUE: ICD-10-CM

## 2023-09-22 LAB
BASOPHILS # BLD AUTO: 0 10E3/UL (ref 0–0.2)
BASOPHILS NFR BLD AUTO: 0 %
CRP SERPL-MCNC: 8.04 MG/L
EOSINOPHIL # BLD AUTO: 0.3 10E3/UL (ref 0–0.7)
EOSINOPHIL NFR BLD AUTO: 4 %
ERYTHROCYTE [DISTWIDTH] IN BLOOD BY AUTOMATED COUNT: 14.4 % (ref 10–15)
ERYTHROCYTE [SEDIMENTATION RATE] IN BLOOD BY WESTERGREN METHOD: 19 MM/HR (ref 0–20)
FERRITIN SERPL-MCNC: 47 NG/ML (ref 6–409)
HCT VFR BLD AUTO: 37.7 % (ref 35–53)
HGB BLD-MCNC: 12.1 G/DL (ref 11.7–17.7)
IMM GRANULOCYTES # BLD: 0 10E3/UL
IMM GRANULOCYTES NFR BLD: 0 %
IRON BINDING CAPACITY (ROCHE): 331 UG/DL (ref 240–430)
IRON SATN MFR SERPL: 8 % (ref 15–46)
IRON SERPL-MCNC: 27 UG/DL (ref 37–157)
LYMPHOCYTES # BLD AUTO: 3 10E3/UL (ref 0.8–5.3)
LYMPHOCYTES NFR BLD AUTO: 34 %
MCH RBC QN AUTO: 27.7 PG (ref 26.5–33)
MCHC RBC AUTO-ENTMCNC: 32.1 G/DL (ref 31.5–36.5)
MCV RBC AUTO: 86 FL (ref 78–100)
MONOCYTES # BLD AUTO: 0.5 10E3/UL (ref 0–1.3)
MONOCYTES NFR BLD AUTO: 5 %
NEUTROPHILS # BLD AUTO: 5 10E3/UL (ref 1.6–8.3)
NEUTROPHILS NFR BLD AUTO: 57 %
NRBC # BLD AUTO: 0 10E3/UL
NRBC BLD AUTO-RTO: 0 /100
PLATELET # BLD AUTO: 242 10E3/UL (ref 150–450)
RBC # BLD AUTO: 4.37 10E6/UL (ref 3.8–5.9)
TSH SERPL DL<=0.005 MIU/L-ACNC: 2.46 UIU/ML (ref 0.3–4.2)
WBC # BLD AUTO: 8.8 10E3/UL (ref 4–11)

## 2023-09-22 PROCEDURE — 86038 ANTINUCLEAR ANTIBODIES: CPT

## 2023-09-22 PROCEDURE — 82728 ASSAY OF FERRITIN: CPT

## 2023-09-22 PROCEDURE — 86140 C-REACTIVE PROTEIN: CPT

## 2023-09-22 PROCEDURE — 85652 RBC SED RATE AUTOMATED: CPT

## 2023-09-22 PROCEDURE — 36415 COLL VENOUS BLD VENIPUNCTURE: CPT

## 2023-09-22 PROCEDURE — 85025 COMPLETE CBC W/AUTO DIFF WBC: CPT

## 2023-09-22 PROCEDURE — 86618 LYME DISEASE ANTIBODY: CPT

## 2023-09-22 PROCEDURE — 84443 ASSAY THYROID STIM HORMONE: CPT

## 2023-09-22 PROCEDURE — 83550 IRON BINDING TEST: CPT

## 2023-09-22 PROCEDURE — 83540 ASSAY OF IRON: CPT

## 2023-09-22 PROCEDURE — 86431 RHEUMATOID FACTOR QUANT: CPT

## 2023-09-22 NOTE — PROGRESS NOTES
Patient was offered choice of vendor and chose [facility].  Patient Tami Rai was set up at [facility] on September 22, 2023. Patient received a Resmed Airsense 11 Pressures were set at  5-12 cm H2O.   Patient s ramp is 5 cm H2O for Off and FLEX/EPR is EPR.  Patient received a Resmed Mask name: AIRFIT N30I  Nasal mask size Medium, heated tubing and heated humidifier.  Patient has the following compliance requirements: usage only  Patient has a follow up on 1/24/24 with Phu Barrera

## 2023-09-25 ENCOUNTER — DOCUMENTATION ONLY (OUTPATIENT)
Dept: SLEEP MEDICINE | Facility: CLINIC | Age: 23
End: 2023-09-25
Payer: COMMERCIAL

## 2023-09-25 LAB
ANA SER QL IF: NEGATIVE
B BURGDOR IGG+IGM SER QL: 0.08
RHEUMATOID FACT SER NEPH-ACNC: <6 IU/ML

## 2023-09-25 NOTE — PROGRESS NOTES
3 day Sleep therapy management telephone visit    Diagnostic AHI: 12.9  PSG    Confirmed with patient at time of call- Yes Patient is still interested in STM service       Spoke with patient they said everything going well with CPAP.          Objective data     Order Settings for PAP  CPAP min     CPAP max              Device settings from machine CPAP min 5.0     CPAP max 12.0           EPR Setting TWO    RESMED soft response  OFF     Assessment: Nightly usage over four hours      Action plan: Patient to have 14 day STM visit. Patient has a follow up visit scheduled:   no    Replacement device: No  STM ordered by provider: Yes     Total time spent on accessing and  interpreting remote patient PAP therapy data  10 minutes    Total time spent counseling, coaching  and reviewing PAP therapy data with patient  2 minutes    [id number] no

## 2023-10-10 ENCOUNTER — DOCUMENTATION ONLY (OUTPATIENT)
Dept: SLEEP MEDICINE | Facility: CLINIC | Age: 23
End: 2023-10-10
Payer: COMMERCIAL

## 2023-10-10 NOTE — PROGRESS NOTES
14  DAY STM VISIT    Diagnostic AHI: 12.9  PSG    Message left for patient to return call     Assessment: Pt meeting objective benchmarks. compliance      Action plan: waiting for patient to return call.  and pt to have 30 day STM visit.      Device type: Auto-CPAP    PAP settings:  CPAP MIN CPAP MAX 95TH % PRESSURE EPR RESMED SOFT RESPONSE SETTING   5.0 cm  H20 12.0 cm  H20 9.8 cm  H20  TWO OFF     Mask type:  Per patient choice    Objective measures: 14 day rolling measures   COMPLIANCE LEAK AHI AVERAGE USE IN MINUTES   71 % 8.14 0.29 306   GOAL >70% GOAL < 24 LPM GOAL <5 GOAL >240      Patient has the following upcoming sleep appts:  Future Sleep Appointments         Provider Department    1/24/2024 3:30 PM (Arrive by 3:15 PM) Phu Cheung PA-C [facility] [place]            Total time spent on accessing and interpreting remote patient PAP therapy data  10 minutes    Total time spent counseling, coaching  and reviewing PAP therapy data with patient  1 minute    [id number]  [id number]  no (3 day STM)

## 2023-10-19 DIAGNOSIS — J30.89 ALLERGIC RHINITIS DUE TO MOLD: ICD-10-CM

## 2023-10-20 RX ORDER — CETIRIZINE HYDROCHLORIDE 10 MG/1
20 TABLET, FILM COATED ORAL DAILY
Qty: 90 TABLET | Refills: 0 | Status: SHIPPED | OUTPATIENT
Start: 2023-10-20

## 2023-10-23 DIAGNOSIS — G43.019 INTRACTABLE MIGRAINE WITHOUT AURA AND WITHOUT STATUS MIGRAINOSUS: ICD-10-CM

## 2023-10-23 RX ORDER — RIZATRIPTAN BENZOATE 10 MG/1
10 TABLET ORAL
Qty: 10 TABLET | Refills: 1 | Status: SHIPPED | OUTPATIENT
Start: 2023-10-23

## 2023-11-11 ENCOUNTER — MYC REFILL (OUTPATIENT)
Dept: FAMILY MEDICINE | Facility: OTHER | Age: 23
End: 2023-11-11
Payer: COMMERCIAL

## 2023-11-11 DIAGNOSIS — G47.00 INSOMNIA, UNSPECIFIED TYPE: ICD-10-CM

## 2023-11-11 DIAGNOSIS — F90.0 ATTENTION DEFICIT HYPERACTIVITY DISORDER (ADHD), PREDOMINANTLY INATTENTIVE TYPE: ICD-10-CM

## 2023-11-13 RX ORDER — DEXTROAMPHETAMINE SACCHARATE, AMPHETAMINE ASPARTATE MONOHYDRATE, DEXTROAMPHETAMINE SULFATE AND AMPHETAMINE SULFATE 7.5; 7.5; 7.5; 7.5 MG/1; MG/1; MG/1; MG/1
30 CAPSULE, EXTENDED RELEASE ORAL DAILY
Qty: 30 CAPSULE | Refills: 0 | Status: SHIPPED | OUTPATIENT
Start: 2023-11-13 | End: 2023-12-18

## 2023-11-13 RX ORDER — TRAZODONE HYDROCHLORIDE 50 MG/1
TABLET, FILM COATED ORAL
Qty: 180 TABLET | Refills: 1 | Status: SHIPPED | OUTPATIENT
Start: 2023-11-13 | End: 2024-06-06

## 2023-12-18 ENCOUNTER — OFFICE VISIT (OUTPATIENT)
Dept: FAMILY MEDICINE | Facility: OTHER | Age: 23
End: 2023-12-18
Payer: COMMERCIAL

## 2023-12-18 ENCOUNTER — TELEPHONE (OUTPATIENT)
Dept: FAMILY MEDICINE | Facility: OTHER | Age: 23
End: 2023-12-18

## 2023-12-18 VITALS
HEART RATE: 96 BPM | DIASTOLIC BLOOD PRESSURE: 72 MMHG | SYSTOLIC BLOOD PRESSURE: 126 MMHG | TEMPERATURE: 99.4 F | HEIGHT: 65 IN | WEIGHT: 293 LBS | OXYGEN SATURATION: 96 % | BODY MASS INDEX: 48.82 KG/M2

## 2023-12-18 DIAGNOSIS — F41.1 GAD (GENERALIZED ANXIETY DISORDER): ICD-10-CM

## 2023-12-18 DIAGNOSIS — Z12.4 CERVICAL CANCER SCREENING: ICD-10-CM

## 2023-12-18 DIAGNOSIS — Z71.89 ADVANCED CARE PLANNING/COUNSELING DISCUSSION: ICD-10-CM

## 2023-12-18 DIAGNOSIS — J45.20 MILD INTERMITTENT ASTHMA WITHOUT COMPLICATION: ICD-10-CM

## 2023-12-18 DIAGNOSIS — F33.2 SEVERE EPISODE OF RECURRENT MAJOR DEPRESSIVE DISORDER, WITHOUT PSYCHOTIC FEATURES (H): ICD-10-CM

## 2023-12-18 DIAGNOSIS — T80.90XA INJECTION SITE REACTION, INITIAL ENCOUNTER: ICD-10-CM

## 2023-12-18 DIAGNOSIS — E66.813 CLASS 3 SEVERE OBESITY WITH SERIOUS COMORBIDITY AND BODY MASS INDEX (BMI) OF 50.0 TO 59.9 IN ADULT, UNSPECIFIED OBESITY TYPE (H): ICD-10-CM

## 2023-12-18 DIAGNOSIS — Z23 NEED FOR IMMUNIZATION AGAINST INFLUENZA: ICD-10-CM

## 2023-12-18 DIAGNOSIS — Z86.19 HISTORY OF CLOSTRIDIOIDES DIFFICILE INFECTION: ICD-10-CM

## 2023-12-18 DIAGNOSIS — Z13.220 SCREENING FOR HYPERLIPIDEMIA: ICD-10-CM

## 2023-12-18 DIAGNOSIS — F64.9 GENDER DYSPHORIA: ICD-10-CM

## 2023-12-18 DIAGNOSIS — Z23 NEED FOR DIPHTHERIA-TETANUS-PERTUSSIS (TDAP) VACCINE: ICD-10-CM

## 2023-12-18 DIAGNOSIS — F90.0 ATTENTION DEFICIT HYPERACTIVITY DISORDER (ADHD), PREDOMINANTLY INATTENTIVE TYPE: ICD-10-CM

## 2023-12-18 DIAGNOSIS — G47.33 OSA (OBSTRUCTIVE SLEEP APNEA): ICD-10-CM

## 2023-12-18 DIAGNOSIS — Z13.1 SCREENING FOR DIABETES MELLITUS: ICD-10-CM

## 2023-12-18 DIAGNOSIS — E66.01 CLASS 3 SEVERE OBESITY WITH SERIOUS COMORBIDITY AND BODY MASS INDEX (BMI) OF 50.0 TO 59.9 IN ADULT, UNSPECIFIED OBESITY TYPE (H): ICD-10-CM

## 2023-12-18 DIAGNOSIS — Z00.00 ROUTINE GENERAL MEDICAL EXAMINATION AT A HEALTH CARE FACILITY: Primary | ICD-10-CM

## 2023-12-18 DIAGNOSIS — E61.1 IRON DEFICIENCY: ICD-10-CM

## 2023-12-18 DIAGNOSIS — Z11.3 SCREENING FOR STDS (SEXUALLY TRANSMITTED DISEASES): ICD-10-CM

## 2023-12-18 LAB
CHOLEST SERPL-MCNC: 124 MG/DL
FASTING STATUS PATIENT QL REPORTED: NO
FERRITIN SERPL-MCNC: 71 NG/ML (ref 6–409)
HBA1C MFR BLD: 5.1 % (ref 0–5.6)
HDLC SERPL-MCNC: 28 MG/DL
IRON BINDING CAPACITY (ROCHE): 336 UG/DL (ref 240–430)
IRON SATN MFR SERPL: 21 % (ref 15–46)
IRON SERPL-MCNC: 69 UG/DL (ref 37–157)
LDLC SERPL CALC-MCNC: 77 MG/DL
NONHDLC SERPL-MCNC: 96 MG/DL
TRIGL SERPL-MCNC: 96 MG/DL

## 2023-12-18 PROCEDURE — 90472 IMMUNIZATION ADMIN EACH ADD: CPT | Performed by: PHYSICIAN ASSISTANT

## 2023-12-18 PROCEDURE — 80061 LIPID PANEL: CPT | Performed by: PHYSICIAN ASSISTANT

## 2023-12-18 PROCEDURE — 83036 HEMOGLOBIN GLYCOSYLATED A1C: CPT | Performed by: PHYSICIAN ASSISTANT

## 2023-12-18 PROCEDURE — G0145 SCR C/V CYTO,THINLAYER,RESCR: HCPCS | Performed by: PHYSICIAN ASSISTANT

## 2023-12-18 PROCEDURE — 93000 ELECTROCARDIOGRAM COMPLETE: CPT | Performed by: PHYSICIAN ASSISTANT

## 2023-12-18 PROCEDURE — 99214 OFFICE O/P EST MOD 30 MIN: CPT | Mod: 25 | Performed by: PHYSICIAN ASSISTANT

## 2023-12-18 PROCEDURE — 90715 TDAP VACCINE 7 YRS/> IM: CPT | Performed by: PHYSICIAN ASSISTANT

## 2023-12-18 PROCEDURE — 36415 COLL VENOUS BLD VENIPUNCTURE: CPT | Performed by: PHYSICIAN ASSISTANT

## 2023-12-18 PROCEDURE — 90471 IMMUNIZATION ADMIN: CPT | Performed by: PHYSICIAN ASSISTANT

## 2023-12-18 PROCEDURE — 82728 ASSAY OF FERRITIN: CPT | Performed by: PHYSICIAN ASSISTANT

## 2023-12-18 PROCEDURE — 83550 IRON BINDING TEST: CPT | Performed by: PHYSICIAN ASSISTANT

## 2023-12-18 PROCEDURE — 83540 ASSAY OF IRON: CPT | Performed by: PHYSICIAN ASSISTANT

## 2023-12-18 PROCEDURE — 99395 PREV VISIT EST AGE 18-39: CPT | Mod: 25 | Performed by: PHYSICIAN ASSISTANT

## 2023-12-18 PROCEDURE — 90686 IIV4 VACC NO PRSV 0.5 ML IM: CPT | Performed by: PHYSICIAN ASSISTANT

## 2023-12-18 RX ORDER — CEPHALEXIN 500 MG/1
500 CAPSULE ORAL 2 TIMES DAILY
Qty: 14 CAPSULE | Refills: 0 | Status: SHIPPED | OUTPATIENT
Start: 2023-12-18 | End: 2023-12-25

## 2023-12-18 RX ORDER — DEXTROAMPHETAMINE SACCHARATE, AMPHETAMINE ASPARTATE MONOHYDRATE, DEXTROAMPHETAMINE SULFATE AND AMPHETAMINE SULFATE 7.5; 7.5; 7.5; 7.5 MG/1; MG/1; MG/1; MG/1
30 CAPSULE, EXTENDED RELEASE ORAL DAILY
Qty: 30 CAPSULE | Refills: 0 | Status: SHIPPED | OUTPATIENT
Start: 2023-12-18 | End: 2024-01-26

## 2023-12-18 RX ORDER — DESVENLAFAXINE 100 MG/1
100 TABLET, EXTENDED RELEASE ORAL DAILY
Qty: 90 TABLET | Refills: 3 | Status: SHIPPED | OUTPATIENT
Start: 2023-12-18

## 2023-12-18 RX ORDER — DEXTROAMPHETAMINE SACCHARATE, AMPHETAMINE ASPARTATE MONOHYDRATE, DEXTROAMPHETAMINE SULFATE AND AMPHETAMINE SULFATE 7.5; 7.5; 7.5; 7.5 MG/1; MG/1; MG/1; MG/1
30 CAPSULE, EXTENDED RELEASE ORAL DAILY
Qty: 30 CAPSULE | Refills: 0 | Status: SHIPPED | OUTPATIENT
Start: 2024-01-18 | End: 2024-02-17

## 2023-12-18 RX ORDER — DEXTROAMPHETAMINE SACCHARATE, AMPHETAMINE ASPARTATE MONOHYDRATE, DEXTROAMPHETAMINE SULFATE AND AMPHETAMINE SULFATE 7.5; 7.5; 7.5; 7.5 MG/1; MG/1; MG/1; MG/1
30 CAPSULE, EXTENDED RELEASE ORAL DAILY
Qty: 30 CAPSULE | Refills: 0 | Status: CANCELLED | OUTPATIENT
Start: 2023-12-18

## 2023-12-18 RX ORDER — DEXTROAMPHETAMINE SACCHARATE, AMPHETAMINE ASPARTATE MONOHYDRATE, DEXTROAMPHETAMINE SULFATE AND AMPHETAMINE SULFATE 7.5; 7.5; 7.5; 7.5 MG/1; MG/1; MG/1; MG/1
30 CAPSULE, EXTENDED RELEASE ORAL DAILY
Qty: 30 CAPSULE | Refills: 0 | Status: SHIPPED | OUTPATIENT
Start: 2024-02-18 | End: 2024-03-19

## 2023-12-18 ASSESSMENT — ANXIETY QUESTIONNAIRES
5. BEING SO RESTLESS THAT IT IS HARD TO SIT STILL: SEVERAL DAYS
GAD7 TOTAL SCORE: 5
7. FEELING AFRAID AS IF SOMETHING AWFUL MIGHT HAPPEN: NOT AT ALL
1. FEELING NERVOUS, ANXIOUS, OR ON EDGE: SEVERAL DAYS
6. BECOMING EASILY ANNOYED OR IRRITABLE: NOT AT ALL
IF YOU CHECKED OFF ANY PROBLEMS ON THIS QUESTIONNAIRE, HOW DIFFICULT HAVE THESE PROBLEMS MADE IT FOR YOU TO DO YOUR WORK, TAKE CARE OF THINGS AT HOME, OR GET ALONG WITH OTHER PEOPLE: NOT DIFFICULT AT ALL
2. NOT BEING ABLE TO STOP OR CONTROL WORRYING: SEVERAL DAYS
3. WORRYING TOO MUCH ABOUT DIFFERENT THINGS: SEVERAL DAYS
GAD7 TOTAL SCORE: 5
4. TROUBLE RELAXING: SEVERAL DAYS

## 2023-12-18 ASSESSMENT — PATIENT HEALTH QUESTIONNAIRE - PHQ9
10. IF YOU CHECKED OFF ANY PROBLEMS, HOW DIFFICULT HAVE THESE PROBLEMS MADE IT FOR YOU TO DO YOUR WORK, TAKE CARE OF THINGS AT HOME, OR GET ALONG WITH OTHER PEOPLE: VERY DIFFICULT
SUM OF ALL RESPONSES TO PHQ QUESTIONS 1-9: 8
SUM OF ALL RESPONSES TO PHQ QUESTIONS 1-9: 8

## 2023-12-18 ASSESSMENT — ENCOUNTER SYMPTOMS
CHILLS: 0
CONSTIPATION: 0
DIZZINESS: 0
WEAKNESS: 0
ABDOMINAL PAIN: 0
ARTHRALGIAS: 0
HEARTBURN: 0
SORE THROAT: 0
COUGH: 1
HEMATURIA: 0
HEMATOCHEZIA: 1
EYE PAIN: 0
PALPITATIONS: 0
DYSURIA: 0
NAUSEA: 0
FREQUENCY: 0
SHORTNESS OF BREATH: 1
NERVOUS/ANXIOUS: 0
HEADACHES: 0
FEVER: 0
DIARRHEA: 1
BREAST MASS: 0
PARESTHESIAS: 0
MYALGIAS: 0
JOINT SWELLING: 0

## 2023-12-18 ASSESSMENT — ASTHMA QUESTIONNAIRES: ACT_TOTALSCORE: 20

## 2023-12-18 ASSESSMENT — PAIN SCALES - GENERAL: PAINLEVEL: MILD PAIN (2)

## 2023-12-18 NOTE — LETTER
My Asthma Action Plan    Name: Tami Rai   YOB: 2000  Date: 12/18/2023   My doctor: Tiffanie Sequeira PA-C   My clinic: [facility]        My Rescue Medicine:   Albuterol inhaler (Proair/Ventolin/Proventil HFA)  2-4 puffs EVERY 4 HOURS as needed. Use a spacer if recommended by your provider.   My Asthma Severity:   Intermittent / Exercise Induced  Know your asthma triggers:     upper respiratory infections          GREEN ZONE   Good Control  I feel good  No cough or wheeze  Can work, sleep and play without asthma symptoms       Take your asthma control medicine every day.     If exercise triggers your asthma, take your rescue medication  15 minutes before exercise or sports, and  During exercise if you have asthma symptoms  Spacer to use with inhaler: If you have a spacer, make sure to use it with your inhaler             YELLOW ZONE Getting Worse  I have ANY of these:  I do not feel good  Cough or wheeze  Chest feels tight  Wake up at night   Keep taking your Green Zone medications  Start taking your rescue medicine:  every 20 minutes for up to 1 hour. Then every 4 hours for 24-48 hours.  If you stay in the Yellow Zone for more than 12-24 hours, contact your doctor.  If you do not return to the Green Zone in 12-24 hours or you get worse, start taking your oral steroid medicine if prescribed by your provider.           RED ZONE Medical Alert - Get Help  I have ANY of these:  I feel awful  Medicine is not helping  Breathing getting harder  Trouble walking or talking  Nose opens wide to breathe       Take your rescue medicine NOW  If your provider has prescribed an oral steroid medicine, start taking it NOW  Call your doctor NOW  If you are still in the Red Zone after 20 minutes and you have not reached your doctor:  Take your rescue medicine again and  Call 911 or go to the emergency room right away    See your regular doctor within 2 weeks of an Emergency Room or Urgent  Care visit for follow-up treatment.          Annual Reminders:  Meet with Asthma Educator,  Flu Shot in the Fall, consider Pneumonia Vaccination for patients with asthma (aged 19 and older).    Pharmacy:    [facility] [place] [facility]  [facility] [place]  [facility] - [place], [place] - [address] [facility] - [place], [place] - [address]    Electronically signed by Tiffanie Sequeira PA-C   Date: 12/18/23                    Asthma Triggers  How To Control Things That Make Your Asthma Worse    Triggers are things that make your asthma worse.  Look at the list below to help you find your triggers and   what you can do about them. You can help prevent asthma flare-ups by staying away from your triggers.      Trigger                                                          What you can do   Cigarette Smoke  Tobacco smoke can make asthma worse. Do not allow smoking in your home, car or around you.  Be sure no one smokes at a child s day care or school.  If you smoke, ask your health care provider for ways to help you quit.  Ask family members to quit too.  Ask your health care provider for a referral to Quit Plan to help you quit smoking, or call 4-547-016-PLAN.     Colds, Flu, Bronchitis  These are common triggers of asthma. Wash your hands often.  Don t touch your eyes, nose or mouth.  Get a flu shot every year.     Dust Mites  These are tiny bugs that live in cloth or carpet. They are too small to see. Wash sheets and blankets in hot water every week.   Encase pillows and mattress in dust mite proof covers.  Avoid having carpet if you can. If you have carpet, vacuum weekly.   Use a dust mask and HEPA vacuum.   Pollen and Outdoor Mold  Some people are allergic to trees, grass, or weed pollen, or molds. Try to keep your windows closed.  Limit time out doors when pollen count is high.   Ask you health care provider about taking medicine  during allergy season.     Animal Dander  Some people are allergic to skin flakes, urine or saliva from pets with fur or feathers. Keep pets with fur or feathers out of your home.    If you can t keep the pet outdoors, then keep the pet out of your bedroom.  Keep the bedroom door closed.  Keep pets off cloth furniture and away from stuffed toys.     Mice, Rats, and Cockroaches  Some people are allergic to the waste from these pests.   Cover food and garbage.  Clean up spills and food crumbs.  Store grease in the refrigerator.   Keep food out of the bedroom.   Indoor Mold  This can be a trigger if your home has high moisture. Fix leaking faucets, pipes, or other sources of water.   Clean moldy surfaces.  Dehumidify basement if it is damp and smelly.   Smoke, Strong Odors, and Sprays  These can reduce air quality. Stay away from strong odors and sprays, such as perfume, powder, hair spray, paints, smoke incense, paint, cleaning products, candles and new carpet.   Exercise or Sports  Some people with asthma have this trigger. Be active!  Ask your doctor about taking medicine before sports or exercise to prevent symptoms.    Warm up for 5-10 minutes before and after sports or exercise.     Other Triggers of Asthma  Cold air:  Cover your nose and mouth with a scarf.  Sometimes laughing or crying can be a trigger.  Some medicines and food can trigger asthma.

## 2023-12-18 NOTE — PROGRESS NOTES
SUBJECTIVE:   Keri is a 23 year old, presenting for the following:  Physical        12/18/2023     3:53 PM   Additional Questions   Roomed by Antonia   Accompanied by Self         12/18/2023     3:53 PM   Patient Reported Additional Medications   Patient reports taking the following new medications NA       Healthy Habits:     Getting at least 3 servings of Calcium per day:  Yes    Bi-annual eye exam:  Yes    Dental care twice a year:  Yes    Sleep apnea or symptoms of sleep apnea:  Daytime drowsiness and Sleep apnea    Diet:  Vegetarian/vegan    Frequency of exercise:  None    Taking medications regularly:  Yes    Medication side effects:  None    Additional concerns today:  Yes      Today's PHQ-9 Score:       12/18/2023     3:43 PM   PHQ-9 SCORE   PHQ-9 Total Score MyChart 8 (Mild depression)   PHQ-9 Total Score 8                 Depression and Anxiety Follow-Up  How are you doing with your depression since your last visit? Improved   How are you doing with your anxiety since your last visit?  Improved   Are you having other symptoms that might be associated with depression or anxiety? No  Have you had a significant life event? Health Concerns and OTHER: school    Do you have any concerns with your use of alcohol or other drugs? No    Social History     Tobacco Use    Smoking status: Passive Smoke Exposure - Never Smoker    Smokeless tobacco: Never    Tobacco comments:     outside of home   Vaping Use    Vaping Use: Never used   Substance Use Topics    Alcohol use: No     Alcohol/week: 0.0 standard drinks of alcohol    Drug use: No     Comment: no exposure         1/16/2023     9:15 AM 5/8/2023    10:29 AM 12/18/2023     3:43 PM   PHQ   PHQ-9 Total Score 8 6 8   Q9: Thoughts of better off dead/self-harm past 2 weeks Not at all Not at all Not at all         1/16/2023     9:17 AM 5/8/2023    10:30 AM 12/18/2023     3:49 PM   FRANKLYN-7 SCORE   Total Score 4 (minimal anxiety) 2 (minimal anxiety) 5 (mild anxiety)    Total Score 4 2 5         12/18/2023     3:43 PM   Last PHQ-9   1.  Little interest or pleasure in doing things 1   2.  Feeling down, depressed, or hopeless 0   3.  Trouble falling or staying asleep, or sleeping too much 1   4.  Feeling tired or having little energy 3   5.  Poor appetite or overeating 1   6.  Feeling bad about yourself 0   7.  Trouble concentrating 1   8.  Moving slowly or restless 1   Q9: Thoughts of better off dead/self-harm past 2 weeks 0   PHQ-9 Total Score 8         12/18/2023     3:49 PM   FRANKLYN-7    1. Feeling nervous, anxious, or on edge 1   2. Not being able to stop or control worrying 1   3. Worrying too much about different things 1   4. Trouble relaxing 1   5. Being so restless that it is hard to sit still 1   6. Becoming easily annoyed or irritable 0   7. Feeling afraid, as if something awful might happen 0   FRANKLYN-7 Total Score 5   If you checked any problems, how difficult have they made it for you to do your work, take care of things at home, or get along with other people? Not difficult at all       Suicide Assessment Five-step Evaluation and Treatment (SAFE-T)    Asthma Follow-Up    Was ACT completed today?  Yes        12/18/2023     3:47 PM   ACT Total Scores   ACT TOTAL SCORE (Goal Greater than or Equal to 20) 20   In the past 12 months, how many times did you visit the emergency room for your asthma without being admitted to the hospital? 0   In the past 12 months, how many times were you hospitalized overnight because of your asthma? 0        How many days per week do you miss taking your asthma controller medication?  0  Please describe any recent triggers for your asthma: upper respiratory infections  Have you had any Emergency Room Visits, Urgent Care Visits, or Hospital Admissions since your last office visit?  No  Migraine   Since your last clinic visit, how have your headaches changed?  Worsened  How often are you getting headaches or migraines? 1 time per week   Are you  able to do normal daily activities when you have a migraine? No  Are you taking rescue/relief medications? (Select all that apply) Maxalt  How helpful is your rescue/relief medication?  The relief is inconsistent  Are you taking any medications to prevent migraines? (Select all that apply)  No  In the past 4 weeks, how often have you gone to urgent care or the emergency room because of your headaches?  0  Have you ever done Advance Care Planning? (For example, a Health Directive, POLST, or a discussion with a medical provider or your loved ones about your wishes): No, advance care planning information given to patient to review.  Advanced care planning was discussed at today's visit.    Social History     Tobacco Use    Smoking status: Passive Smoke Exposure - Never Smoker    Smokeless tobacco: Never    Tobacco comments:     outside of home   Substance Use Topics    Alcohol use: No     Alcohol/week: 0.0 standard drinks of alcohol             12/18/2023     3:47 PM   Alcohol Use   Prescreen: >3 drinks/day or >7 drinks/week? No     Reviewed orders with patient.  Reviewed health maintenance and updated orders accordingly - Yes  BP Readings from Last 3 Encounters:   12/18/23 126/72   03/10/23 117/77   03/10/20 116/76    Wt Readings from Last 3 Encounters:   12/18/23 (!) 152.2 kg (335 lb 8 oz)   03/10/23 (!) 158.8 kg (350 lb)   08/23/21 146.5 kg (323 lb)                  Patient Active Problem List   Diagnosis    Mild intermittent asthma without complication    Class 3 severe obesity due to excess calories without serious comorbidity with body mass index (BMI) of 50.0 to 59.9 in adult (H)    Seasonal allergies    Vitamin D deficiency    Anemia, iron deficiency    Migraine without aura and without status migrainosus, not intractable    Severe episode of recurrent major depressive disorder, without psychotic features (H)    FRANKLYN (generalized anxiety disorder)    Pain in both hands    Radial styloid tenosynovitis     Attention deficit hyperactivity disorder (ADHD), predominantly inattentive type    Diarrhea, unspecified type    Allergic rhinitis due to mold    Recurrent infections    Hives    Sinus pressure    Gender dysphoria     Past Surgical History:   Procedure Laterality Date    Deviated septum repair  08/2022    Turbinates reduction, a nerve that was frozen in the left side       Social History     Tobacco Use    Smoking status: Passive Smoke Exposure - Never Smoker    Smokeless tobacco: Never    Tobacco comments:     outside of home   Substance Use Topics    Alcohol use: No     Alcohol/week: 0.0 standard drinks of alcohol     Family History   Problem Relation Age of Onset    Deep Vein Thrombosis (DVT) Mother         Unknown cause    Substance Abuse Father     Lymphoma Father         ??    Asthma Sister     Mental Illness Brother     Cardiovascular Maternal Grandfather         stent    Coronary Artery Disease Paternal Grandmother     Breast Cancer Paternal Grandmother     Other Cancer Paternal Grandfather     Obesity Other     Asthma No family hx of     Coronary Artery Disease No family hx of     Breast Cancer No family hx of     Other Cancer No family hx of     Anxiety Disorder No family hx of     Substance Abuse No family hx of     Thyroid Disease No family hx of            Breast Cancer Screening:        History of abnormal Pap smear: NO - age 21-29 PAP every 3 years recommended     Reviewed and updated as needed this visit by clinical staff   Tobacco  Allergies  Meds              Reviewed and updated as needed this visit by Provider                     Review of Systems   Constitutional:  Negative for chills and fever.   HENT:  Positive for congestion and ear pain. Negative for hearing loss and sore throat.    Eyes:  Negative for pain and visual disturbance.   Respiratory:  Positive for cough and shortness of breath.    Cardiovascular:  Negative for chest pain and palpitations.   Gastrointestinal:  Positive for  "diarrhea. Negative for abdominal pain, constipation and nausea.   Genitourinary:  Negative for dysuria, frequency, genital sores, hematuria and urgency.   Musculoskeletal:  Negative for arthralgias, joint swelling and myalgias.   Skin:  Negative for rash.   Neurological:  Negative for dizziness, weakness and headaches.   Psychiatric/Behavioral:  The patient is not nervous/anxious.           OBJECTIVE:   /72   Pulse 96   Temp 99.4  F (37.4  C) (Temporal)   Ht 1.66 m (5' 5.35\")   Wt (!) 152.2 kg (335 lb 8 oz)   LMP 09/25/2023   SpO2 96%   BMI 55.23 kg/m    Physical Exam  GENERAL: healthy, alert and no distress  EYES: Eyes grossly normal to inspection, PERRL and conjunctivae and sclerae normal  HENT: ear canals and TM's normal, nose and mouth without ulcers or lesions  NECK: no adenopathy, no asymmetry, masses, or scars and thyroid normal to palpation  RESP: lungs clear to auscultation - no rales, rhonchi or wheezes  BREAST: normal without masses, tenderness or nipple discharge and no palpable axillary masses or adenopathy  CV: regular rate and rhythm, normal S1 S2, no S3 or S4, no murmur, click or rub, no peripheral edema and peripheral pulses strong  ABDOMEN: soft, nontender, no hepatosplenomegaly, no masses and bowel sounds normal   (female): normal female external genitalia, normal urethral meatus, vaginal mucosa pink, moist, well rugated, and normal cervix/adnexa/uterus without masses or discharge  MS: no gross musculoskeletal defects noted, no edema  SKIN: no suspicious lesions or rashes  NEURO: Normal strength and tone, mentation intact and speech normal  PSYCH: mentation appears normal, affect normal/bright    Diagnostic Test Results:  Labs reviewed in Epic  Results for orders placed or performed in visit on 12/18/23 (from the past 24 hour(s))   Hemoglobin A1c   Result Value Ref Range    Hemoglobin A1C 5.1 0.0 - 5.6 %       ASSESSMENT/PLAN:       ICD-10-CM    1. Routine general medical " examination at a health care facility  Z00.00       2. Screening for STDs (sexually transmitted diseases)  Z11.3       3. Cervical cancer screening  Z12.4 Pap Screen only - recommended age 21 - 24 years      4. Screening for hyperlipidemia  Z13.220 Lipid panel reflex to direct LDL Fasting     Lipid panel reflex to direct LDL Fasting      5. Screening for diabetes mellitus  Z13.1 Hemoglobin A1c     Hemoglobin A1c      6. Need for diphtheria-tetanus-pertussis (Tdap) vaccine  Z23 TDAP 10-64Y (ADACEL,BOOSTRIX)      7. Need for immunization against influenza  Z23 INFLUENZA VACCINE IM > 6 MONTHS VALENT IIV4 (AFLURIA/FLUZONE)      8. Advanced care planning/counseling discussion  Z71.89       9. Attention deficit hyperactivity disorder (ADHD), predominantly inattentive type  F90.0 amphetamine-dextroamphetamine (ADDERALL XR) 30 MG 24 hr capsule     amphetamine-dextroamphetamine (ADDERALL XR) 30 MG 24 hr capsule     amphetamine-dextroamphetamine (ADDERALL XR) 30 MG 24 hr capsule      10. Class 3 severe obesity with serious comorbidity and body mass index (BMI) of 50.0 to 59.9 in adult, unspecified obesity type (H)  E66.01 EKG 12-lead complete w/read - Clinics    Z68.43 naltrexone-bupropion (CONTRAVE) 8-90 MG per 12 hr tablet      11. FRANKLYN (generalized anxiety disorder)  F41.1 desvenlafaxine (PRISTIQ) 100 MG 24 hr tablet      12. Mild intermittent asthma without complication  J45.20       13. Severe episode of recurrent major depressive disorder, without psychotic features (H)  F33.2 desvenlafaxine (PRISTIQ) 100 MG 24 hr tablet      14. History of Clostridioides difficile infection  Z86.19       15. Iron deficiency  E61.1 Iron and iron binding capacity     Ferritin     Iron and iron binding capacity     Ferritin      16. Injection site reaction, initial encounter  T80.90XA cephALEXin (KEFLEX) 500 MG capsule      17. ELKIN (obstructive sleep apnea)  G47.33       18. Gender dysphoria  F64.9         Mental Health:  - Overall doing  well.  She has been through counseling and uses coping mechanisms mainly for their gender dysphoria.   - Continue on Desvenlafaxine and Trazodone.     ELKIN:  - Going to be seeing sleep psychologist to try and help with their sleep  - Using CPAP but difficult when sick because they use nasal pillows.     ADHD:  - Stable on Adderall XR 30 mg dose #30 per month.   - will refill for 6 months. Follow-up in 6 months virtually.     Hx of C Diff:  - Has resolved. Hx of diarrhea/softer stools in general.     Iron deficiency:  - Started to incorporate more animal protein due to hx of iron deficiency.     Gender Dysphoria:  - Plan is to have top surgery still.  I personally feel that Keri would benefit from a mental health and physical health standpoint with having bilateral mastectomy to help with gender dysphoria.   - Thinking about having hysterectomy in the future.   - Pap smear and breast exam were completed  - ok to order Mammogram if needed prior to mastectomy.     Injection site irritation:  - After having Tdap and influenza in same arm today she noticed it was itching.  There is mild erythema that is blanching without edema but there are excoriations present. Injection sites themself appear normal.  Recommended to not drive back to [facility] and rather stay with parents.  Take 2 Zyrtec tonight.  Take same dose tomorrow if needed.   - Did send cephalexin to pharmacy if needed for local infection but they will monitor symptoms.   - Can use topical cortisone.   - They are aware to go to the ER if any tongue, lip swelling, shortness of breath, etc.     Obesity:  - They have been on Saxenda which worked, but barely ate, noticed more GI symptoms at higher doses and is unable to get it in stock.   - They are open to other options.   - Consider phentermine +/- topiramate but already on stimulant and having cravings so will try Naltrexone + wellbutrin and if not covered can try them individually   - Did obtain EKG in  "the event we go forward with Phentermine, no concerns.   - Will send information on BMR, and work on dietary modifications first as exercise is difficult to fit into their schedule.   - Follow-up virtually in the next 1-2 months.   - Max weight was 350 lbs.           COUNSELING:  Reviewed preventive health counseling, as reflected in patient instructions      BMI:   Estimated body mass index is 55.23 kg/m  as calculated from the following:    Height as of this encounter: 1.66 m (5' 5.35\").    Weight as of this encounter: 152.2 kg (335 lb 8 oz).   Weight management plan: Discussed healthy diet and exercise guidelines      Keri Rai reports that Keri Rai is a non-smoker but has been exposed to tobacco smoke. Keri Rai has never used smokeless tobacco.          Tiffanie Sequeira PA-C  [facility]  Answers submitted by the patient for this visit:  Patient Health Questionnaire (Submitted on 12/18/2023)  If you checked off any problems, how difficult have these problems made it for you to do your work, take care of things at home, or get along with other people?: Very difficult  PHQ9 TOTAL SCORE: 8  FRANKLYN-7 (Submitted on 12/18/2023)  FRANKLYN 7 TOTAL SCORE: 5  Annual Preventive Visit (Submitted on 12/18/2023)  Chief Complaint: Annual Exam:  Blood in stool: Yes  heartburn: No  peripheral edema: No  mood changes: No  Skin sensation changes: No  pelvic pain: No  vaginal bleeding: No  vaginal discharge: No  tenderness: No  breast mass: No  breast discharge: No  impotence: No  penile discharge: No    "

## 2023-12-18 NOTE — TELEPHONE ENCOUNTER
Pt seen in office today and felt a reaction after receiving her tdap and flu vaccination today     Pt received vaccination on right upper arm. Pt right should became red, warm to touch, and slightly raised.     RN reviewed with provider pt saw during visit. Provider reviewed with pt to take 2 zyrtec a day till rash is gone, got to ER is worsening and discussed in detail which signs and symptoms to look out for.     Patient verbalized understanding and in agreement with plan of care. Pt will stay in [place] with family till tomorrow and report back to provider with any concerns.     Leanne Hansen RN

## 2023-12-19 ENCOUNTER — TELEPHONE (OUTPATIENT)
Dept: FAMILY MEDICINE | Facility: OTHER | Age: 23
End: 2023-12-19
Payer: COMMERCIAL

## 2023-12-19 NOTE — TELEPHONE ENCOUNTER
naltrexone-bupropion (CONTRAVE) 8-90 MG per 12 hr tablet     Prior Authorization Retail Medication Request    Medication/Dose: naltrexone-bupropion (CONTRAVE) 8-90 MG per 12 hr tablet  Diagnosis and ICD code (if different than what is on RX):    New/renewal/insurance change PA/secondary ins. PA:  Previously Tried and Failed:    Rationale:      Insurance   Primary:   Insurance ID:      Secondary (if applicable):  Insurance ID:      Pharmacy Information (if different than what is on RX)  Name:    Phone:    Fax:

## 2023-12-20 LAB
BKR LAB AP GYN ADEQUACY: NORMAL
BKR LAB AP GYN INTERPRETATION: NORMAL
BKR LAB AP HPV REFLEX: NO
BKR LAB AP LMP: NORMAL
BKR LAB AP PREVIOUS ABNORMAL: NORMAL
PATH REPORT.COMMENTS IMP SPEC: NORMAL
PATH REPORT.COMMENTS IMP SPEC: NORMAL
PATH REPORT.RELEVANT HX SPEC: NORMAL

## 2023-12-24 NOTE — TELEPHONE ENCOUNTER
Prior Authorization Not Needed per Insurance    Medication: CONTRAVE 8-90 MG PO TB12  Insurance Company: [employer] Clinical Review - Phone [phone] Fax [phone]  Expected CoPay: $    Pharmacy Filling the Rx: [facility] [address] [place] [address]  Pharmacy Notified: YES  Patient Notified: **Instructed pharmacy to notify patient when script is ready to /ship.**    ALREADY APPROVED VIA EPIC EPA

## 2024-01-24 ENCOUNTER — VIRTUAL VISIT (OUTPATIENT)
Dept: SLEEP MEDICINE | Facility: CLINIC | Age: 24
End: 2024-01-24
Payer: COMMERCIAL

## 2024-01-24 DIAGNOSIS — G47.33 OSA (OBSTRUCTIVE SLEEP APNEA): Primary | ICD-10-CM

## 2024-01-24 PROCEDURE — 99213 OFFICE O/P EST LOW 20 MIN: CPT | Mod: 95 | Performed by: PHYSICIAN ASSISTANT

## 2024-01-24 ASSESSMENT — SLEEP AND FATIGUE QUESTIONNAIRES
HOW LIKELY ARE YOU TO NOD OFF OR FALL ASLEEP WHILE SITTING AND TALKING TO SOMEONE: WOULD NEVER DOZE
HOW LIKELY ARE YOU TO NOD OFF OR FALL ASLEEP WHILE SITTING AND READING: SLIGHT CHANCE OF DOZING
HOW LIKELY ARE YOU TO NOD OFF OR FALL ASLEEP IN A CAR, WHILE STOPPED FOR A FEW MINUTES IN TRAFFIC: WOULD NEVER DOZE
HOW LIKELY ARE YOU TO NOD OFF OR FALL ASLEEP WHILE LYING DOWN TO REST IN THE AFTERNOON WHEN CIRCUMSTANCES PERMIT: HIGH CHANCE OF DOZING
HOW LIKELY ARE YOU TO NOD OFF OR FALL ASLEEP WHEN YOU ARE A PASSENGER IN A CAR FOR AN HOUR WITHOUT A BREAK: HIGH CHANCE OF DOZING
HOW LIKELY ARE YOU TO NOD OFF OR FALL ASLEEP WHILE SITTING INACTIVE IN A PUBLIC PLACE: WOULD NEVER DOZE
HOW LIKELY ARE YOU TO NOD OFF OR FALL ASLEEP WHILE SITTING QUIETLY AFTER LUNCH WITHOUT ALCOHOL: SLIGHT CHANCE OF DOZING
HOW LIKELY ARE YOU TO NOD OFF OR FALL ASLEEP WHILE WATCHING TV: SLIGHT CHANCE OF DOZING

## 2024-01-24 NOTE — PROGRESS NOTES
Does Tami have a CPAP/Bipap?  Yes     Type of mask: Nasal     [facility]: [facility] [phone]    [email or web address]     [place] Sleep Scale:  9    Sleep Apnea - Follow-up Visit:    Impression/Plan:     Mild Sleep apnea. She is Tolerating PAP well, apnea is well controlled. She was ill twice during December and had difficulty wearing the CPAP, but that has improved. She is feeling benefit from use.  Daytime symptoms are improved.   Supplies ordered    Tami Rai will follow up in about 1 year(s).     Fifteen minutes spent with patient, all of which were spent face-to-face counseling, consulting, coordinating plan of care.      CC:  Tiffanie Sequeira,         History of Present Illness:  Chief Complaint   Patient presents with    CPAP Follow Up       Tami Rai presents for follow-up of their mild sleep apnea, managed with CPAP.     No specialty comments available.    Do you use a CPAP Machine at home: Yes  Overall, on a scale of 0-10 how would you rate your CPAP (0 poor, 10 great): 7    What type of mask do you use: Nasal Pillow  Is your mask comfortable: Yes  If not, why:      Is your mask leaking: No  If yes, where do you feel it:    How many night per week does the mask leak (0-7):      Do you notice snoring with mask on: No  Do you notice gasping arousals with mask on: No  Are you having significant oral or nasal dryness: Yes  Is the pressure setting comfortable: Yes  If not, why:      What is your typical bedtime: 8pm-11pm  How long does it take you to go to sleep on PAP therapy: 30-60mins  What time do you typically get out of bed for the day: 4:30am-10am  How many hours on average per night are you using PAP therapy: 7  How many hours are you sleeping per night: 8-9  Do you feel well rested in the morning: Yes      [employer]   CPAP  cmH2O 30 day usage data:  66% of days with > 4 hours of use. 9/30 days with no use.   Average use 316 minutes per day.   95%ile  Leak 7.54 L/min.   AHI 0.32 events per hour.     Auto-PAP 5.0 - 12.0 cmH2O 30 day usage data:    66% of days with > 4 hours of use. 9/30 days with no use.   Average use 316 minutes per day.   95%ile Leak 7.54 L/min.   CPAP 95% pressure 9 cm.   AHI 0.32 events per hour.        [place]WORTH SLEEPINESS SCALE         1/24/2024     3:36 PM    [place] Sleepiness Scale ( NIKKI Caro  [id number]<br>ESS - [place]/English - Final version - 21 Nov 07 - [facility] [place].)   Sitting and reading Slight chance of dozing   Watching TV Slight chance of dozing   Sitting, inactive in a public place (e.g. a theatre or a meeting) Would never doze   As a passenger in a car for an hour without a break High chance of dozing   Lying down to rest in the afternoon when circumstances permit High chance of dozing   Sitting and talking to someone Would never doze   Sitting quietly after a lunch without alcohol Slight chance of dozing   In a car, while stopped for a few minutes in traffic Would never doze   [place] Score (MC) 9   [place] Score (Sleep) 9       INSOMNIA SEVERITY INDEX (MIGUELANGEL)          1/24/2024     3:34 PM   Insomnia Severity Index (MIGUELANGEL)   Difficulty falling asleep 2   Difficulty staying asleep 2   Problems waking up too early 0   How SATISFIED/DISSATISFIED are you with your CURRENT sleep pattern? 2   How NOTICEABLE to others do you think your sleep problem is in terms of impairing the quality of your life? 1   How WORRIED/DISTRESSED are you about your current sleep problem? 3   To what extent do you consider your sleep problem to INTERFERE with your daily functioning (e.g. daytime fatigue, mood, ability to function at work/daily chores, concentration, memory, mood, etc.) CURRENTLY? 2   MIGUELANGEL Total Score 12       Guidelines for Scoring/Interpretation:  Total score categories:  0-7 = No clinically significant insomnia   8-14 = Subthreshold insomnia   15-21 = Clinical insomnia (moderate severity)  22-28 = Clinical insomnia (severe)  Used via  courtesy of www.[place]health.[place].gov with permission from Omer Bello PhD., [facility]      Past medical/surgical history, family history, social history, medications and allergies were reviewed.        Problem List:  Patient Active Problem List    Diagnosis Date Noted    Gender dysphoria 12/18/2023     Priority: Medium    Sinus pressure 06/01/2020     Priority: Medium    Hives 03/10/2020     Priority: Medium    Diarrhea, unspecified type 01/14/2020     Priority: Medium    Allergic rhinitis due to mold 01/14/2020     Priority: Medium    Recurrent infections 01/14/2020     Priority: Medium    Attention deficit hyperactivity disorder (ADHD), predominantly inattentive type 06/21/2019     Priority: Medium    Pain in both hands 05/01/2018     Priority: Medium    Radial styloid tenosynovitis 05/01/2018     Priority: Medium    Severe episode of recurrent major depressive disorder, without psychotic features (H) 03/23/2018     Priority: Medium    FRANKLYN (generalized anxiety disorder) 03/23/2018     Priority: Medium    Migraine without aura and without status migrainosus, not intractable 12/13/2017     Priority: Medium     Improved with topamax, d/c 4/18      Anemia, iron deficiency 08/24/2016     Priority: Medium    Vitamin D deficiency 07/27/2016     Priority: Medium    Seasonal allergies 09/03/2010     Priority: Medium     Fall and spring      Class 3 severe obesity due to excess calories without serious comorbidity with body mass index (BMI) of 50.0 to 59.9 in adult (H) 07/12/2007     Priority: Medium    Mild intermittent asthma without complication 12/27/2006     Priority: Medium        LMP 09/25/2023

## 2024-01-26 ENCOUNTER — MYC REFILL (OUTPATIENT)
Dept: FAMILY MEDICINE | Facility: OTHER | Age: 24
End: 2024-01-26
Payer: COMMERCIAL

## 2024-01-26 DIAGNOSIS — F90.0 ATTENTION DEFICIT HYPERACTIVITY DISORDER (ADHD), PREDOMINANTLY INATTENTIVE TYPE: ICD-10-CM

## 2024-01-26 DIAGNOSIS — J98.01 ACUTE BRONCHOSPASM: ICD-10-CM

## 2024-01-26 DIAGNOSIS — E55.9 VITAMIN D DEFICIENCY: Primary | ICD-10-CM

## 2024-01-26 DIAGNOSIS — F41.1 GAD (GENERALIZED ANXIETY DISORDER): ICD-10-CM

## 2024-01-26 RX ORDER — ALBUTEROL SULFATE 90 UG/1
2 AEROSOL, METERED RESPIRATORY (INHALATION) EVERY 4 HOURS PRN
Qty: 18 G | Refills: 4 | Status: SHIPPED | OUTPATIENT
Start: 2024-01-26

## 2024-01-26 RX ORDER — DEXTROAMPHETAMINE SACCHARATE, AMPHETAMINE ASPARTATE MONOHYDRATE, DEXTROAMPHETAMINE SULFATE AND AMPHETAMINE SULFATE 7.5; 7.5; 7.5; 7.5 MG/1; MG/1; MG/1; MG/1
30 CAPSULE, EXTENDED RELEASE ORAL DAILY
Qty: 30 CAPSULE | Refills: 0 | Status: SHIPPED | OUTPATIENT
Start: 2024-01-26 | End: 2024-03-04

## 2024-01-26 NOTE — TELEPHONE ENCOUNTER
Pending Prescriptions:                       Disp   Refills    amphetamine-dextroamphetamine (ADDERALL XR*30 cap*0        Sig: Take 1 capsule (15 mg) by mouth daily        Routing refill request to provider for review/approval because:  Drug not on the [facility] refill protocol   Last Seen: 10/27/20  Last Refilled: 12/27/20 30Tablets/Capsules  Refills: 0  Next Visit: FIDEL Tejada RN  November 9, 2020           tablet     Sig: Take 1 tablet by mouth every 6 hours as needed for Pain for up to 3 days. Max Daily Amount: 200 mg     Dispense:  15 tablet     Refill:  0    ibuprofen (IBU) 600 MG tablet     Sig: Take 1 tablet by mouth every 6 hours as needed for Pain     Dispense:  20 tablet     Refill:  0    medroxyPROGESTERone (PROVERA) 10 MG tablet     Sig: Take 1 tablet by mouth daily     Dispense:  10 tablet     Refill:  0      Interventions listed are used to treat Problem list above    Patient presents today to the emergency department with abnormal vaginal bleeding.  No gross hemorrhagic process.  Belly exam benign.  Lab work here does reveal a hemoglobin of 8.8.  No baseline to compare this to.  She is hemodynamically stable actually bit hypertensive.  Heart rate is below 100.  Will provide Provera the first dose here in the ED.  As well as prescription as well as gynecologic follow-up.  For outpatient follow-up for this menorrhagia.    Differential diagnosis includes pregnancy, miscarriage, dysfunctional uterine bleeding.    I have discussed the findings of today's workup with the patient and present family members and have addressed their questions and concerns. Important warning signs as well as new or worsening symptoms which would necessitate immediate return to the ED were discussed. The plan is to discharge from the ED at this time, and the patient is in stable condition. The patient acknowledged understanding is agreeable with this plan.The patient and/or family and I have discussed the diagnosis and risks, and we agree with discharging home to follow-up with their primary care, specialist or referral doctor. Questions addressed. Disposition and follow-up agreed upon. Specific discharge instructions explained.  We have discussed the symptoms which are most concerning that necessitate immediate return. We also discussed returning to the Emergency Department immediately if new or worsening symptoms occur.        I  independently managed patient today in the ED.       BP (!) 167/111   Pulse 98   Temp 97.9 °F (36.6 °C) (Oral)   Resp 20   SpO2 98%       Clinical Impression:  1. Menorrhagia with irregular cycle        Disposition referral (if applicable):  [facility] Emergency Department  [address]  [facility] [id number] [phone]  In 2 days  If symptoms worsen or persist    Susie Saavedra MD  [address] [facility] [id number] [phone]    In 1 week      Disposition medications (if applicable):  Discharge Medication List as of 1/25/2024 10:52 PM        START taking these medications    Details   traMADol (ULTRAM) 50 MG tablet Take 1 tablet by mouth every 6 hours as needed for Pain for up to 3 days. Max Daily Amount: 200 mg, Disp-15 tablet, R-0Print      ibuprofen (IBU) 600 MG tablet Take 1 tablet by mouth every 6 hours as needed for Pain, Disp-20 tablet, R-0Print      medroxyPROGESTERone (PROVERA) 10 MG tablet Take 1 tablet by mouth daily, Disp-10 tablet, R-0Print               Comment: Please note this report has been produced using speech recognition software and may contain errors related to that system including errors in grammar, punctuation, and spelling, as well as words and phrases that may be inappropriate. If there are any questions or concerns please feel free to contact the dictating provider for clarification.    Please note Images are personally interpreted by this Provider (BRANDON Eckert. )However final disposition is made with deference to Radiologist interpretation of said images.       CHRIS Calderon Jeremy A, PA-C  01/27/24 0227

## 2024-02-12 ENCOUNTER — TELEPHONE (OUTPATIENT)
Dept: FAMILY MEDICINE | Facility: OTHER | Age: 24
End: 2024-02-12
Payer: COMMERCIAL

## 2024-02-12 DIAGNOSIS — E66.01 CLASS 3 SEVERE OBESITY WITH SERIOUS COMORBIDITY AND BODY MASS INDEX (BMI) OF 50.0 TO 59.9 IN ADULT, UNSPECIFIED OBESITY TYPE (H): Primary | ICD-10-CM

## 2024-02-12 DIAGNOSIS — G47.33 OSA (OBSTRUCTIVE SLEEP APNEA): ICD-10-CM

## 2024-02-12 DIAGNOSIS — E66.813 CLASS 3 SEVERE OBESITY WITH SERIOUS COMORBIDITY AND BODY MASS INDEX (BMI) OF 50.0 TO 59.9 IN ADULT, UNSPECIFIED OBESITY TYPE (H): Primary | ICD-10-CM

## 2024-02-12 NOTE — LETTER
February 27, 2024      Tami Rai  [address]    [facility] [address]        To Whom It May Concern:    Would like to appeal the denial for Contrave 8-90 mg PO TB12.    She has the following conditions:   E66.01 - Class 3 severe obesity with serious comorbidity and body mass index (BMI) of 50.0 to 59.9 in adult, unspecified obesity type (H)   G47.33 - ELKIN (obstructive sleep apnea)     She has been on Saxenda-Liraglutide but ended up having gastrointestinal side effects and severe loss of appetite.   She is currently on stimulant medication - Adderall XR - for her ADHD.     At this time do not feel doing additional stimulant medication like Phentermine would be ideal and Contrave can be helpful for food cravings and reduce these issues for the patient.     I would recommend moving forward with Contrave versus phentermine trial at this time due to risk for elevated blood pressure and tachycardia as well as worsening her chronic insomnia.     For any additional questions or concerns please feel free to reach out.       Sincerely,        Tiffanie Sequeira PA-C

## 2024-02-12 NOTE — TELEPHONE ENCOUNTER
naltrexone-bupropion (CONTRAVE) 8-90 MG per 12 hr tablet     Prior Authorization Retail Medication Request    Medication/Dose: naltrexone-bupropion (CONTRAVE) 8-90 MG per 12 hr tablet  Diagnosis and ICD code (if different than what is on RX):    New/renewal/insurance change PA/secondary ins. PA:  Previously Tried and Failed:    Rationale:      Insurance   Primary:   Insurance ID:      Secondary (if applicable):  Insurance ID:      Pharmacy Information (if different than what is on RX)  Name:    Phone:    Fax:    Key: [id number]

## 2024-02-25 NOTE — TELEPHONE ENCOUNTER
PA Initiation    Medication: CONTRAVE 8-90 MG PO TB12  Insurance Company: [employer] Clinical Review - Phone [phone] Fax [phone]  Pharmacy Filling the Rx: [facility] [address] [place] [address]  Filling Pharmacy Phone: [phone]  Filling Pharmacy Fax: [phone]  Start Date: 2/25/2024

## 2024-02-26 NOTE — TELEPHONE ENCOUNTER
PRIOR AUTHORIZATION DENIED    Medication: CONTRAVE 8-90 MG PO TB12    Insurance Company: [employer] - Phone [phone] Fax [phone]    Denial Date: 2/25/2024    Denial Reason(s): Patient needs to try and fail phentermine.     Appeal Information:

## 2024-02-27 NOTE — TELEPHONE ENCOUNTER
Medication Appeal Initiation    Medication: CONTRAVE 8-90 MG PO TB12  Appeal Start Date:  2/27/2024  Insurance Company: [employer] - Phone [phone] Fax [phone]   Insurance Phone: [phone]   Insurance Fax: [phone]   Comments:

## 2024-02-28 NOTE — TELEPHONE ENCOUNTER
Prior Authorization Approval    Medication: CONTRAVE 8-90 MG PO TB12  Authorization Effective Date:    Authorization Expiration Date:    Approved Dose/Quantity:   Reference #:     Insurance Company: [email or web address] Clinical Review - Phone [phone] Fax [phone]  Expected CoPay: $    CoPay Card Available:      Financial Assistance Needed:   Which Pharmacy is filling the prescription: [facility] [address] [place] [address]  Pharmacy Notified: YES  Patient Notified: **Instructed pharmacy to notify patient when script is ready to /ship.**

## 2024-03-04 ENCOUNTER — MYC REFILL (OUTPATIENT)
Dept: FAMILY MEDICINE | Facility: OTHER | Age: 24
End: 2024-03-04
Payer: COMMERCIAL

## 2024-03-04 DIAGNOSIS — F90.0 ATTENTION DEFICIT HYPERACTIVITY DISORDER (ADHD), PREDOMINANTLY INATTENTIVE TYPE: ICD-10-CM

## 2024-03-04 RX ORDER — DEXTROAMPHETAMINE SACCHARATE, AMPHETAMINE ASPARTATE MONOHYDRATE, DEXTROAMPHETAMINE SULFATE AND AMPHETAMINE SULFATE 7.5; 7.5; 7.5; 7.5 MG/1; MG/1; MG/1; MG/1
30 CAPSULE, EXTENDED RELEASE ORAL DAILY
Qty: 30 CAPSULE | Refills: 0 | Status: SHIPPED | OUTPATIENT
Start: 2024-03-04

## 2024-03-15 DIAGNOSIS — F90.0 ATTENTION DEFICIT HYPERACTIVITY DISORDER (ADHD), PREDOMINANTLY INATTENTIVE TYPE: Primary | ICD-10-CM

## 2024-03-15 RX ORDER — DEXTROAMPHETAMINE SACCHARATE, AMPHETAMINE ASPARTATE MONOHYDRATE, DEXTROAMPHETAMINE SULFATE AND AMPHETAMINE SULFATE 7.5; 7.5; 7.5; 7.5 MG/1; MG/1; MG/1; MG/1
30 CAPSULE, EXTENDED RELEASE ORAL DAILY
Qty: 30 CAPSULE | Refills: 0 | Status: SHIPPED | OUTPATIENT
Start: 2024-05-16 | End: 2024-06-15

## 2024-03-15 RX ORDER — DEXTROAMPHETAMINE SACCHARATE, AMPHETAMINE ASPARTATE MONOHYDRATE, DEXTROAMPHETAMINE SULFATE AND AMPHETAMINE SULFATE 7.5; 7.5; 7.5; 7.5 MG/1; MG/1; MG/1; MG/1
30 CAPSULE, EXTENDED RELEASE ORAL DAILY
Qty: 30 CAPSULE | Refills: 0 | Status: SHIPPED | OUTPATIENT
Start: 2024-03-15 | End: 2024-04-14

## 2024-03-15 RX ORDER — DEXTROAMPHETAMINE SACCHARATE, AMPHETAMINE ASPARTATE MONOHYDRATE, DEXTROAMPHETAMINE SULFATE AND AMPHETAMINE SULFATE 7.5; 7.5; 7.5; 7.5 MG/1; MG/1; MG/1; MG/1
30 CAPSULE, EXTENDED RELEASE ORAL DAILY
Qty: 30 CAPSULE | Refills: 0 | Status: SHIPPED | OUTPATIENT
Start: 2024-04-15 | End: 2024-05-15

## 2024-03-18 ASSESSMENT — SLEEP AND FATIGUE QUESTIONNAIRES
HOW LIKELY ARE YOU TO NOD OFF OR FALL ASLEEP WHILE SITTING AND READING: SLIGHT CHANCE OF DOZING
HOW LIKELY ARE YOU TO NOD OFF OR FALL ASLEEP WHEN YOU ARE A PASSENGER IN A CAR FOR AN HOUR WITHOUT A BREAK: HIGH CHANCE OF DOZING
HOW LIKELY ARE YOU TO NOD OFF OR FALL ASLEEP WHILE SITTING AND TALKING TO SOMEONE: WOULD NEVER DOZE
HOW LIKELY ARE YOU TO NOD OFF OR FALL ASLEEP WHILE LYING DOWN TO REST IN THE AFTERNOON WHEN CIRCUMSTANCES PERMIT: HIGH CHANCE OF DOZING
HOW LIKELY ARE YOU TO NOD OFF OR FALL ASLEEP IN A CAR, WHILE STOPPED FOR A FEW MINUTES IN TRAFFIC: WOULD NEVER DOZE
HOW LIKELY ARE YOU TO NOD OFF OR FALL ASLEEP WHILE SITTING QUIETLY AFTER LUNCH WITHOUT ALCOHOL: SLIGHT CHANCE OF DOZING
HOW LIKELY ARE YOU TO NOD OFF OR FALL ASLEEP WHILE WATCHING TV: SLIGHT CHANCE OF DOZING
HOW LIKELY ARE YOU TO NOD OFF OR FALL ASLEEP WHILE SITTING INACTIVE IN A PUBLIC PLACE: WOULD NEVER DOZE

## 2024-03-19 ENCOUNTER — VIRTUAL VISIT (OUTPATIENT)
Dept: SLEEP MEDICINE | Facility: CLINIC | Age: 24
End: 2024-03-19
Attending: PHYSICIAN ASSISTANT
Payer: COMMERCIAL

## 2024-03-19 VITALS — BODY MASS INDEX: 48.82 KG/M2 | WEIGHT: 293 LBS | HEIGHT: 65 IN

## 2024-03-19 DIAGNOSIS — F51.04 CHRONIC INSOMNIA: Primary | ICD-10-CM

## 2024-03-19 DIAGNOSIS — G47.33 OSA (OBSTRUCTIVE SLEEP APNEA): ICD-10-CM

## 2024-03-19 DIAGNOSIS — F51.04 PSYCHOPHYSIOLOGIC INSOMNIA: ICD-10-CM

## 2024-03-19 PROCEDURE — 90791 PSYCH DIAGNOSTIC EVALUATION: CPT | Mod: 95 | Performed by: PSYCHOLOGIST

## 2024-03-19 ASSESSMENT — PAIN SCALES - GENERAL: PAINLEVEL: NO PAIN (0)

## 2024-03-19 NOTE — PROGRESS NOTES
Visit Start Time: 1:09 PM  Visit End Time: 2 PM      SLEEP MEDICINE CONSULTATION  Sleep Psychology  Mar 19, 2024    Name: Tami Rai MRN# [id number]   Age: 23 year old YOB: 2000     Date of Consultation: Mar 19, 2024  Consultation is requested by: Phu Cheung PA-C  [address] [facility] [address]  Primary care provider: Tiffanie Sequeira    Reason for Sleep Consultation     Tami Rai is a 23 year old adult seen today for a behavioral sleep medicine consultation because of insomnia    Assessment and Plan     Sleep Diagnoses:            Chronic insomnia  ELKIN (obstructive sleep apnea)    Co-occurring Conditions:         Major Depressive Disorder       FRANKLYN        ADHD    Clinical Impressions:    Tami Rai was seen for a sleep psychology consultation and possible behavioral sleep intervention and treatment. History and clinical presentation suggests multifactorial insomnia associated with major depressive disorder, generalized anxiety disorder and ADHD along with currently treated mild obstructive sleep apnea.  In some respects, insomnia is likely due to depression and anxiety though independent psychophysiologic factors also appear.  To play a significant role in insomnia maintenance.  Patient is a suitable candidate for cognitive behavioral therapy for insomnia.  Variation in sleep offset due to different work and school demands also.  Contribute to sleep instability.    Recommendations and Counselin.  On workdays, establish a consistent 9-hour sleep schedule between 8-5 AM    2.  On alternate days, keep a 11 PM-8 AM sleep schedule.    3.  In the morning, it is important that you get exposure to bright light.  Sit in your window, use your computer, take your medications and set your alarm clock away from your bed so that you have to get up to turn it off.    4.  Try to discontinue use of screens and devices at least an hour before bed and initiate a relaxing  routine.    5.  Reading reviewed the relaxation module which discusses strategies to relax before bed and a strategy to help manage unwanted worry.    Tami was provided information about the pathophysiology of insomnia, psychophysiological factors contributing to the onset and maintenance of insomnia and how co-occurring medical conditions and intrinsic sleep disorders can affect sleep.  Treatment options were discussed  as applicable to patient specific sleep concerns and symptoms. The benefits and potential early side effects of treatment including increased daytime sleepiness were discussed.     Patient was advised to consult with their prescribing provider around use of or changes to prescription sleep medication.  Patient was counseled on the importance of avoiding driving if drowsy.    Services provided are compliant with the requirements of [place] Statute SS 256B.0625 Subd. 3b and paragraph (b)     Follow-up: as needed     History of Present Sleep Complaint     Tami Rai is a 23 year old year old adult who presents with a longstanding history of insomnia associated with depression and anxiety. They have initiated PAP therapy for ELKIN and finds it is somewhat helpful.  They continue to have difficulty initiating and maintaining sleep.      They are studying music and plans to go to graduate school in Music Therapy.  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    They do reports elevated levels of situational stress due to school and work demands.  In the evening she tends to worry.      Activities in bed: Eat, Use phone, computer, or tablet    Prescribed or OTC stimulants: Generic Adderall: 30mg extended release daily, 5mg tablet as needed towards the middle of the day.    Prescribed or OTC sleep medication: Trazodone 50-100mg, Melatonin 5-10mg    Previous sleep medications patients has tried: N/A    SLEEP-WAKE SCHEDULE:    Work/School Days: Patient goes to school/work: Yes     Time to  Bed:  Work: 6:30-7pm, School: 9-10:30pm    Sleep Latency: Anywhere from about 5 minutes to over an hour, depending to fall asleep    Difficulty falling asleep:  1-2 nights per week    Number of awakenings: Infrequently. Maybe once or twice a week, usually the same night. times per night due to Uncertain    Trouble falling back asleep 1-2: if I wake up in the middle of the night, I always have trouble falling back to sleep  times a week     Usually takes 20-60 minutes, sometimes I cannot fall back asleep at all to get back to sleep              Rise time: Anywhere from 4am (work) to 10am (school days, days off)    Uses alarm? Yes    Weekends/Non-work Days/All Other Days    Usually gets into bed at When I don't have anything the next day, which is quite infrequent, I try to be in bed by midnight.     Sleep latency:  Anywhere from 5 minutes to about an hour.     Rise time: About 10am-11am.    Uses alarm? Yes    Patient sleep estimates:    Average total sleep time:  Probably 9-10 hours on average     Patient estimate of sleep need: At least 8 hours, but probably closer to 9+    Preferred sleep position(s): Side, Head Elevated     Naps    Intentional naps: 1 day a week max times a week    Duration:  Anywhere from 30mins to 2-3 hours in duration    Feels better after a nap: Yes    Unintentional Dozing:  None days per week    Driving accident or near-miss due to sleepiness/drowsiness? No    SLEEP DISRUPTIONS:    Breathing/Snoring    Patient snores:Yes    Other people complain about Keri Rai's snoring: No    Patient has been told Keri Rai stops breathing in Jesezigyg Rai's sleep: Yes    Keri Rai has issues with any of the following: Morning headaches, Morning mouth dryness, Stuffy nose when you wake up    Movement:    Patient gets pain, discomfort, with an urge to move: Yes    Symptoms occur when Keri Rai is resting: Yes    Symptoms occur more at night:Yes    Patient has been told Keri BABB  Fredi Rai's legs at night:Yes     Behaviors in Sleep:    Tami Rai has experienced the following behaviors while sleeping: Recurring Nightmares    Keri Rai has experienced sudden muscle weakness during the day:      Caffeine, Alcohol and Other Substances:    Number of caffeinated beverages(per day: 1    Last caffeine use is usually: At the latest, 2pm.    Uses alcohol to promote sleep: No    Drinks alcohol near bedtime: No      FAMILY HISTORY OF SLEEP DISORDERS    Patient has a family member been diagnosed with a sleep disorder: Yes  Father: sleep apnea. Both siblings: insomnia           SCALES      EPWORTH SLEEPINESS SCALE    Likeliness of dozing or falling  asleep:    Sitting and reading: Slight chance of dozing    Watching TV: Slight chance of dozing    Sitting, inactive in a public place (e.g. a theatre or a meeting): Would never doze    As a passenger in a car for an hour without a break: High chance of dozing    Lying down to rest in the afternoon when circumstances permit: High chance of dozing    Sitting and talking to someone: Would never doze    Sitting quietly after a lunch without alcohol: Slight chance of dozing    In a car, while stopped for a few minutes in traffic: Would never doze    Total score - Hanna: 9      INSOMNIA SEVERITY INDEX (MIGUELANGEL)      The Insomnia Severity Index measures the severity of insomnia symptoms over the past two weeks.    1. Difficulty falling asleep: Mild    2. Difficulty staying asleep: Mild    3. Problems waking up too early: None    4. How SATISFIED/DISSATISFIED are youwith your CURRENT sleep pattern? Very Dissatisfied    5. How NOTICEABLE to others do you think your sleep problem is in terms of impairing the quality of your life? Somewhat      6. How WORRIED/DISTRESSED are you about your sleep problem? Much    7. To what extent do you consider your sleep problem to INTERFERE with your daily functioning (e.g. daytime fatigue, mood, ability to  [place] at work/daily chores, concentration, memory, mood, etc.) CURRENTLY?   Very Much Interfering     MIGUELANGEL Total Score: 15    Guidelines for Scoring/Interpretation:   0-7 = No clinically significant insomnia   8-14 = Subthreshold insomnia   15-21 = Clinical insomnia (moderate severity)  22-28 = Clinical insomnia (severe)      STOP BANG     1. Snoring: Do you snore loudly (louder than talking or loud enough to be heard through closed doors)?  No    2. Tired: Do you often feel tired, fatigued, or sleepy during daytime?  Yes    3. Observed: Has anyone observed you stop breathing during your sleep?  Yes    4. Blood pressure: Do you have or are you being treated for high blood pressure?  No    5. BMI: BMI more than 35 kg/m2?  Yes    6. Age: Age over 50 yr old?  No    7. Neck circumference: Neck circumference greater than 40 cm?  Yes    8. Gender: Gender male?  No    STOP-BANG Total Score: 4    ELKIN Risk  0-2 Low risk for ELKIN  3-4  Intermediate risk for ELKIN  5-8 High risk    PHQ-9     Over the last 2 weeks, how often have you been bothered by any of the following problems?    1. Little interest or pleasure in doing things -      2. Feeling down, depressed, or hopeless -      3. Trouble falling or staying asleep, or sleeping too much -     4. Feeling tired or having little energy -      5. Poor appetite or overeating -      6. Feeling bad about yourself - or that you are a failure or have let yourself or your family down -      7. Trouble concentrating on things, such as reading the newspaper or watching television -     8. Moving or speaking so slowly that other people could have noticed? Or the opposite - being so fidgety or restless that you have been moving around a lot more than usual     9. Thoughts that you would be better off dead or of hurting  yourself in some way     Total Score:       If you checked off any problems, how difficult have these problems made it for you to do your work, take care of things at home,  or get along with other people?      Developed by Kathryn Calvillo, Roz Burdick, Rishi Bermeo and colleagues, with an educational yolie from [employer] No permission required to reproduce, translate, display or distribute. permission required to reproduce, translate, display or distribute.     FRANKLYN-7        1/16/2023     9:17 AM 5/8/2023    10:30 AM 12/18/2023     3:49 PM   FRANKLYN-7 SCORE   Total Score 4 (minimal anxiety) 2 (minimal anxiety) 5 (mild anxiety)   Total Score 4 2 5         Previous Sleep Consultations/Studies     5/26/2023 [facility] Study (350.0 lbs) - AHI 12.9, RDI 17.3, Supine AHI 30.0, REM AHI - , Low O2 88.0%, Time Spent ?88% 0.1 minutes / Time Spent ?89% 0.4 minutes    Sleep Medicine followup visit 1//14/14  Mild Sleep apnea. They are Tolerating PAP well, apnea is well controlled. She was ill twice during December and had difficulty wearing the CPAP, but that has improved. They are feeling benefit from use.  Daytime symptoms are improved.      Vitals     There were no vitals taken for this visit.     Medical History     Allergies:    Allergies   Allergen Reactions    Amoxicillin     Pamelor [Nortriptyline] Hives     Hives on both arms, itching on arms and across chest        Medications:    Current Outpatient Medications   Medication Sig Dispense Refill    albuterol (PROAIR HFA/PROVENTIL HFA/VENTOLIN HFA) 108 (90 Base) MCG/ACT inhaler Inhale 2 puffs into the lungs every 4 hours as needed for shortness of breath or wheezing 18 g 4    albuterol (PROVENTIL) (2.5 MG/3ML) 0.083% neb solution Take 1 vial (2.5 mg) by nebulization every 6 hours as needed for shortness of breath / dyspnea or wheezing 90 mL 3    amphetamine-dextroamphetamine (ADDERALL XR) 30 MG 24 hr capsule Take 1 capsule (30 mg) by mouth daily for 30 days 30 capsule 0    [START ON 4/15/2024] amphetamine-dextroamphetamine (ADDERALL XR) 30 MG 24 hr capsule Take 1 capsule (30 mg) by mouth daily for 30 days 30  capsule 0    [START ON 5/16/2024] amphetamine-dextroamphetamine (ADDERALL XR) 30 MG 24 hr capsule Take 1 capsule (30 mg) by mouth daily for 30 days 30 capsule 0    amphetamine-dextroamphetamine (ADDERALL XR) 30 MG 24 hr capsule Take 1 capsule (30 mg) by mouth daily 30 capsule 0    amphetamine-dextroamphetamine (ADDERALL XR) 30 MG 24 hr capsule Take 1 capsule (30 mg) by mouth daily for 30 days 30 capsule 0    azelastine (ASTELIN) 0.1 % nasal spray Spray 2 sprays into both nostrils 2 times daily 1 Bottle 11    cholecalciferol (VITAMIN D3) 10 mcg (400 units) TABS tablet Take 1 tablet (10 mcg) by mouth daily 90 tablet 1    desvenlafaxine (PRISTIQ) 100 MG 24 hr tablet Take 1 tablet (100 mg) by mouth daily 90 tablet 3    EQ ALLERGY RELIEF, CETIRIZINE, 10 MG tablet Take 2 tablets by mouth once daily 90 tablet 0    etonogestrel (IMPLANON/NEXPLANON) 68 MG IMPL 1 each (68 mg) by Subdermal route once      melatonin 5 MG CAPS Take 1 capsule by mouth at bedtime 90 capsule 1    naltrexone-bupropion (CONTRAVE) 8-90 MG per 12 hr tablet Take 1 tablet daily for a week then 1 tablet twice daily for a week then 2 tablets in the AM and 1 tablet in PM for a week then 2 tablets twice daily 120 tablet 1    rizatriptan (MAXALT) 10 MG tablet Take 1 tablet (10 mg) by mouth at onset of headache for migraine May repeat in 2 hours. Max 3 tablets/24 hours. 10 tablet 1    traZODone (DESYREL) 50 MG tablet TAKE 1 TO 2 TABLETS BY MOUTH AT BEDTIME 180 tablet 1       Problem List:  Patient Active Problem List    Diagnosis Date Noted    Gender dysphoria 12/18/2023     Priority: Medium    Sinus pressure 06/01/2020     Priority: Medium    Hives 03/10/2020     Priority: Medium    Diarrhea, unspecified type 01/14/2020     Priority: Medium    Allergic rhinitis due to mold 01/14/2020     Priority: Medium    Recurrent infections 01/14/2020     Priority: Medium    Attention deficit hyperactivity disorder (ADHD), predominantly inattentive type 06/21/2019      Priority: Medium    Pain in both hands 05/01/2018     Priority: Medium    Radial styloid tenosynovitis 05/01/2018     Priority: Medium    Severe episode of recurrent major depressive disorder, without psychotic features (H) 03/23/2018     Priority: Medium    FRANKLYN (generalized anxiety disorder) 03/23/2018     Priority: Medium    Migraine without aura and without status migrainosus, not intractable 12/13/2017     Priority: Medium     Improved with topamax, d/c 4/18      Anemia, iron deficiency 08/24/2016     Priority: Medium    Vitamin D deficiency 07/27/2016     Priority: Medium    Seasonal allergies 09/03/2010     Priority: Medium     Fall and spring      Class 3 severe obesity due to excess calories without serious comorbidity with body mass index (BMI) of 50.0 to 59.9 in adult (H) 07/12/2007     Priority: Medium    Mild intermittent asthma without complication 12/27/2006     Priority: Medium        Past Medical/Surgical History:  Past Medical History:   Diagnosis Date    Bacterial pneumonia, unspecified 10/02    Hospitalized, [facility], wheezing    Depressive disorder 2015    Mild persistent asthma     Hospitalized 4/07     Patient Active Problem List    Diagnosis Date Noted    Gender dysphoria 12/18/2023     Priority: Medium    Sinus pressure 06/01/2020     Priority: Medium    Hives 03/10/2020     Priority: Medium    Diarrhea, unspecified type 01/14/2020     Priority: Medium    Allergic rhinitis due to mold 01/14/2020     Priority: Medium    Recurrent infections 01/14/2020     Priority: Medium    Attention deficit hyperactivity disorder (ADHD), predominantly inattentive type 06/21/2019     Priority: Medium    Pain in both hands 05/01/2018     Priority: Medium    Radial styloid tenosynovitis 05/01/2018     Priority: Medium    Severe episode of recurrent major depressive disorder, without psychotic features (H) 03/23/2018     Priority: Medium    FRANKLYN (generalized anxiety disorder) 03/23/2018     Priority: Medium     Migraine without aura and without status migrainosus, not intractable 12/13/2017     Priority: Medium     Improved with topamax, d/c 4/18      Anemia, iron deficiency 08/24/2016     Priority: Medium    Vitamin D deficiency 07/27/2016     Priority: Medium    Seasonal allergies 09/03/2010     Priority: Medium     Fall and spring      Class 3 severe obesity due to excess calories without serious comorbidity with body mass index (BMI) of 50.0 to 59.9 in adult (H) 07/12/2007     Priority: Medium    Mild intermittent asthma without complication 12/27/2006     Priority: Medium     Patient Active Problem List   Diagnosis    Mild intermittent asthma without complication    Class 3 severe obesity due to excess calories without serious comorbidity with body mass index (BMI) of 50.0 to 59.9 in adult (H)    Seasonal allergies    Vitamin D deficiency    Anemia, iron deficiency    Migraine without aura and without status migrainosus, not intractable    Severe episode of recurrent major depressive disorder, without psychotic features (H)    FRANKLYN (generalized anxiety disorder)    Pain in both hands    Radial styloid tenosynovitis    Attention deficit hyperactivity disorder (ADHD), predominantly inattentive type    Diarrhea, unspecified type    Allergic rhinitis due to mold    Recurrent infections    Hives    Sinus pressure    Gender dysphoria        Most Recent Labs:  Office Visit on 12/18/2023   Component Date Value Ref Range Status    Interpretation 12/18/2023 Negative for Intraepithelial Lesion or Malignancy (NILM)    Final    Comment 12/18/2023    Final                    Value:This result contains rich text formatting which cannot be displayed here.    Specimen Adequacy 12/18/2023 Satisfactory for evaluation, endocervical/transformation zone component absent   Final    Clinical Information 12/18/2023    Final                    Value:This result contains rich text formatting which cannot be displayed here.    LMP/Menopause Date  12/18/2023    Final                    Value:This result contains rich text formatting which cannot be displayed here.    Reflex Testing 12/18/2023 No   Final    Previous Abnormal? 12/18/2023    Final                    Value:This result contains rich text formatting which cannot be displayed here.    Performing Labs 12/18/2023    Final                    Value:This result contains rich text formatting which cannot be displayed here.    Hemoglobin A1C 12/18/2023 5.1  0.0 - 5.6 % Final    Normal <5.7%   Prediabetes 5.7-6.4%    Diabetes 6.5% or higher     Note: Adopted from ADA consensus guidelines.    Iron 12/18/2023 69  37 - 157 ug/dL Final    Iron Binding Capacity 12/18/2023 336  240 - 430 ug/dL Final    Iron Sat Index 12/18/2023 21  15 - 46 % Final    Ferritin 12/18/2023 71  6 - 409 ng/mL Final    Male Reference Range:  7 Months-10 Years   6-111 ng/mL  10-18 Years          ng/mL  18 Years and up      ng/mL      Female Reference Range:  7 Months-18 Years   8-115 ng/mL  18-51 Years         6-175 ng/mL  51 Years and up      ng/mL        Cholesterol 12/18/2023 124  <200 mg/dL Final    Triglycerides 12/18/2023 96  <150 mg/dL Final    Direct Measure HDL 12/18/2023 28 (L)  >=40 mg/dL Final    LDL Cholesterol Calculated 12/18/2023 77  <=100 mg/dL Final    Non HDL Cholesterol 12/18/2023 96  <130 mg/dL Final    Patient Fasting > 8hrs? 12/18/2023 No   Final     Mental Health History     Prior Mental Health Diagnosis:   Major Depressive Disorder, Generalized Anxieety Disorder, ADHD    Mental Health Treatment:   Primary care medication management,. Psycho therapy, she has done Gene SIght testing, divenlafaxine, Adderall    Chemical Abuse/Treatment:    None reported        Family History     Family History   Problem Relation Age of Onset    Deep Vein Thrombosis (DVT) Mother         Unknown cause    Substance Abuse Father     Lymphoma Father         ??    Asthma Sister     Mental Illness Brother      Cardiovascular Maternal Grandfather         stent    Coronary Artery Disease Paternal Grandmother     Breast Cancer Paternal Grandmother     Other Cancer Paternal Grandfather     Obesity Other     Asthma No family hx of     Coronary Artery Disease No family hx of     Breast Cancer No family hx of     Other Cancer No family hx of     Anxiety Disorder No family hx of     Substance Abuse No family hx of     Thyroid Disease No family hx of        Social History     Social History     Socioeconomic History    Marital status: Single   Tobacco Use    Smoking status: Passive Smoke Exposure - Never Smoker    Smokeless tobacco: Never    Tobacco comments:     outside of home   Vaping Use    Vaping Use: Never used   Substance and Sexual Activity    Alcohol use: No     Alcohol/week: 0.0 standard drinks of alcohol    Drug use: No     Comment: no exposure    Sexual activity: Not Currently     Partners: Male     Birth control/protection: Implant   Other Topics Concern    Parent/sibling w/ CABG, MI or angioplasty before 65F 55M? No     Social Determinants of Health     Financial Resource Strain: Low Risk  (12/18/2023)    Financial Resource Strain     Within the past 12 months, have you or your family members you live with been unable to get utilities (heat, electricity) when it was really needed?: No   Food Insecurity: Low Risk  (12/18/2023)    Food Insecurity     Within the past 12 months, did you worry that your food would run out before you got money to buy more?: No     Within the past 12 months, did the food you bought just not last and you didn t have money to get more?: No   Transportation Needs: Low Risk  (12/18/2023)    Transportation Needs     Within the past 12 months, has lack of transportation kept you from medical appointments, getting your medicines, non-medical meetings or appointments, work, or from getting things that you need?: No   Interpersonal Safety: Low Risk  (12/18/2023)    Interpersonal Safety     Do you  feel physically and emotionally safe where you currently live?: Yes     Within the past 12 months, have you been hit, slapped, kicked or otherwise physically hurt by someone?: No     Within the past 12 months, have you been humiliated or emotionally abused in other ways by your partner or ex-partner?: No   Housing Stability: Low Risk  (12/18/2023)    Housing Stability     Do you have housing? : Yes     Are you worried about losing your housing?: No       \     Mental Status Examination     Tami presented as oriented X3 with speech and language intact.  The patient was cooperative throughout the evaluation with no signs of hallucinations, delusions, loosening of associations or other thought disturbance.  Mood was normal Affect was congruent with mood. Insight and judgement were intact.  Memory appeared intact for recent and remote elements.  There was no report of suicidal ideation, intention or plan. Attention and concentration were within normal.        Helio Arango, Ayo, LP, DBSM  Diplomate, Behavioral Sleep Medicine  [facility]      Copy:   Tiffanie Sequeira PA-C  [address] [place] [address]    Note: This dictation was created using voice recognition software. This document may contain an error not identified before finalizing the document. If the error changes the accuracy of the document, I would appreciate it being brought to my attention.

## 2024-03-19 NOTE — NURSING NOTE
Is the patient currently in the state of [place]? YES    Visit mode:VIDEO    If the visit is dropped, the patient can be reconnected by: VIDEO VISIT: Send to e-mail at: [email or web address]    Will anyone else be joining the visit? NO  (If patient encounters technical issues they should call [phone]    How would you like to obtain your AVS? MyChart    Are changes needed to the allergy or medication list? No    Reason for visit: Consult    Al YIP

## 2024-03-19 NOTE — PROGRESS NOTES
Virtual Visit Details    Type of service:  Video Visit   Originating Location (pt. Location): Home    Distant Location (provider location):  Off-site  Platform used for Video Visit: Domenico

## 2024-03-20 NOTE — PATIENT INSTRUCTIONS
1.  On workdays, establish a consistent 9-hour sleep schedule between 8-5 AM    2.  On alternate days, keep a 11 PM-8 AM sleep schedule.    3.  In the morning, it is important that you get exposure to bright light.  Sit in your window, use your computer, take your medications and set your alarm clock away from your bed so that you have to get up to turn it off.    4.  Try to discontinue use of screens and devices at least an hour before bed and initiate a relaxing routine.    5.  Reading reviewed the relaxation module which discusses strategies to relax before bed and a strategy to help manage unwanted worry.          CBT-I Module - Relaxation Training    Relaxation and Sleep    Winding Down Time    A wind-down time before you go to bed can help you relax your mind and body. Ways to help transition from a busy day to bedtime include things like:    Listening to calm music  Reading  Watching a pleasant or relaxing TV show  Taking a bath    We recommend you set a reminder to begin your wind down time one hour before bedtime.  Many mobile sleep apps such as Insomnia  have wind-down time reminders.    Relaxation and Sleep    Research shows relaxing before bed can reduce the time it takes to fall asleep and improve sleep quality.  Relaxation training works by reducing physical, emotional and mental arousal that can interfere with your sleep.     Relaxation skills are easy to learn on your own using widely available free mobile apps or online resources.  We recommend doing some form of relaxation exercise daily for at least 10 minutes.  This can include short breathing exercises used throughout the day to help manage stress.    If you are using the Insomnia  mobile bernabe you will find  relaxation exercises by pressing the Tools icon  going to the Relax Your Body or Quiet Your Mind section.     Insight Timer, Calm, and Headspace are examples of well-reviewed mobile apps that offer free, for-purchase, and  subscription guided relaxation exercises and meditations.    Do not use guided relaxation tapes while driving or operating equipment.      Managing Worry before Bed and During the Night    All human beings worry.  Uncontrolled worry can affect your sleep and health by activating your arousal system.  Worry makes your brain, body and heart behave like there is a danger or threat.  This stress response can make it more difficult to fall asleep and stay asleep. The most common types of worry include:    Concerned about unfinished tasks  Concern over family, finances or work  Worry about things beyond your control    Scheduling Constructive Worry Time    The part of our brain that plans, sorts, and helps manage our emotions is less effective in doing its job as nighttime comes.  Without the usual distractions of the day, we tend to worry more and have trouble putting aside our worries.    A simple technique called Constructive Worry Time can help to reduce and manage worry as you approach bedtime or in the middle of the night. It is most effective when practiced daily.

## 2024-05-11 ENCOUNTER — MYC REFILL (OUTPATIENT)
Dept: FAMILY MEDICINE | Facility: OTHER | Age: 24
End: 2024-05-11
Payer: COMMERCIAL

## 2024-05-11 DIAGNOSIS — F90.0 ATTENTION DEFICIT HYPERACTIVITY DISORDER (ADHD), PREDOMINANTLY INATTENTIVE TYPE: ICD-10-CM

## 2024-05-13 RX ORDER — DEXTROAMPHETAMINE SACCHARATE, AMPHETAMINE ASPARTATE MONOHYDRATE, DEXTROAMPHETAMINE SULFATE AND AMPHETAMINE SULFATE 7.5; 7.5; 7.5; 7.5 MG/1; MG/1; MG/1; MG/1
30 CAPSULE, EXTENDED RELEASE ORAL DAILY
Qty: 30 CAPSULE | Refills: 0 | OUTPATIENT
Start: 2024-05-13

## 2024-05-14 ENCOUNTER — VIRTUAL VISIT (OUTPATIENT)
Dept: SLEEP MEDICINE | Facility: CLINIC | Age: 24
End: 2024-05-14
Payer: COMMERCIAL

## 2024-05-14 VITALS — HEIGHT: 65 IN | WEIGHT: 293 LBS | BODY MASS INDEX: 48.82 KG/M2

## 2024-05-14 DIAGNOSIS — G47.33 OSA (OBSTRUCTIVE SLEEP APNEA): ICD-10-CM

## 2024-05-14 DIAGNOSIS — F51.04 CHRONIC INSOMNIA: Primary | ICD-10-CM

## 2024-05-14 PROCEDURE — 90832 PSYTX W PT 30 MINUTES: CPT | Mod: 95 | Performed by: PSYCHOLOGIST

## 2024-05-14 ASSESSMENT — SLEEP AND FATIGUE QUESTIONNAIRES
HOW LIKELY ARE YOU TO NOD OFF OR FALL ASLEEP WHILE WATCHING TV: HIGH CHANCE OF DOZING
HOW LIKELY ARE YOU TO NOD OFF OR FALL ASLEEP WHILE SITTING QUIETLY AFTER LUNCH WITHOUT ALCOHOL: WOULD NEVER DOZE
HOW LIKELY ARE YOU TO NOD OFF OR FALL ASLEEP WHILE SITTING AND READING: SLIGHT CHANCE OF DOZING
HOW LIKELY ARE YOU TO NOD OFF OR FALL ASLEEP WHILE SITTING INACTIVE IN A PUBLIC PLACE: WOULD NEVER DOZE
HOW LIKELY ARE YOU TO NOD OFF OR FALL ASLEEP WHEN YOU ARE A PASSENGER IN A CAR FOR AN HOUR WITHOUT A BREAK: HIGH CHANCE OF DOZING
HOW LIKELY ARE YOU TO NOD OFF OR FALL ASLEEP IN A CAR, WHILE STOPPED FOR A FEW MINUTES IN TRAFFIC: WOULD NEVER DOZE
HOW LIKELY ARE YOU TO NOD OFF OR FALL ASLEEP WHILE SITTING AND TALKING TO SOMEONE: WOULD NEVER DOZE
HOW LIKELY ARE YOU TO NOD OFF OR FALL ASLEEP WHILE LYING DOWN TO REST IN THE AFTERNOON WHEN CIRCUMSTANCES PERMIT: HIGH CHANCE OF DOZING

## 2024-05-14 ASSESSMENT — PAIN SCALES - GENERAL: PAINLEVEL: MILD PAIN (3)

## 2024-05-14 NOTE — NURSING NOTE
Is the patient currently in the state of [place]? YES    Visit mode:VIDEO    If the visit is dropped, the patient can be reconnected by: VIDEO VISIT: Send to e-mail at: [email or web address]    Will anyone else be joining the visit? NO  (If patient encounters technical issues they should call [phone]    How would you like to obtain your AVS? MyChart    Are changes needed to the allergy or medication list? Pt stated no changes to allergies and Pt stated no med changes    Are refills needed on medications prescribed by this physician? NO    Reason for visit: RECHECK    Teodora YIP    Has patient had flu shot for current/most recent flu season? If so, when? Yes: 12/2023

## 2024-05-14 NOTE — PROGRESS NOTES
Virtual Visit Details    Type of service:  Video Visit   Video Start Time: Originating Location (pt. Location): Home    Distant Location (provider location):  Off-site  Platform used for Video Visit: AmWell    Visit Start Time: 2:01 PM  Visit End Time:2:23 PM       SLEEP MEDICINE VIRTUAL FOLLOW-UP VISIT  Behavioral Sleep Medicine    Patient Name: Tami Rai  MRN:  [id number]  Date of Service: May 14, 2024       Subjective Report     Tami Rai  returns for a telehealth video visit to discuss progress in implementing behavioral strategies for the management of insomnia.  Patient consent for initiation of video visit was obtained and documented prior to initiation of visit.     Tami reports that implementing recommended sleep plan was difficult due to varied school demands and schedule.  She has spoken with her employer and secured a stable shift from 6 am and 2 pm. On 4 days a week..     They have not been using CPAP on a regular basis.  However they recently began  Initial Recommendations.    1.  On workdays, establish a consistent 9-hour sleep schedule between 8-5 AM     2.  On alternate days, keep a 11 PM-8 AM sleep schedule.     3.  In the morning, it is important that you get exposure to bright light.  Sit in your window, use your computer, take your medications and set your alarm clock away from your bed so that you have to get up to turn it off.     4.  Try to discontinue use of screens and devices at least an hour before bed and initiate a relaxing routine.     5.  Reading reviewed the relaxation module which discusses strategies to relax before bed and a strategy to help manage unwanted worry.        Sleep Data:     Source of Sleep Estimates:  Verbal Self-report      EPWORTH SLEEPINESS SCALE    Sitting and reading 1   Watching TV 3   Sitting, inactive in a public place (theatre or mtg.) 0    As a passenger in a car 3   Lying down to rest in the afternoon when circumstance permit 3   Sitting and  "talking to someone 0   Sitting quietly after lunch without alcohol 0   In a car, while stopped for a few minutes in traffic 0   TOTAL SCORE (nl <11) 10     INSOMNIA SEVERITY INDEX     Difficulty falling asleep 1   Difficult staying asleep 1   Problems waking up to early 0   How SATISFIED/DISSATISFIED are you with your CURRENT sleep pattern? 2   How NOTICEABLE to others do you think your sleep pattern is in terms of your quality of life? 2   How WORRIED/DISTRESSED are you about your current sleep pattern? 3   To what extent do you consider your sleep problem to INTERFERE with your daily fuctioning(e.g. daytime fatigue, mood, ability to function at work/daily chores, concentration, mood,etc.) CURRENTLY? 3   INSOMNIA SEVERITY INDEX TOTAL SCORE 12    Absence of insomnia (0-7); sub-threshold insomnia (8-14); moderate insomnia (15-21); and severe insomnia (22-28)       Interventions     Strategies and recommendations including Stimulus control therapy, Sleep hygiene training, and Depression and sleep were reviewed and discussed today.     Services provided are compliant with the requirements of [place] Statute SS 256B.0625 Subd. 3b and paragraph (b)       Vital Signs     Ht 1.651 m (5' 5\")   Wt 149.7 kg (330 lb)   BMI 54.91 kg/m       Mental Status     Orientation:  X3  Mood:  normal  Affect:  Congruent with mood  Speech/Language:  Normal  Thought Process: Intact  Associations:  Normal  Thought Content: Normal  Patient does not report any suicidal ideation, intention or plan.    Diagnostic Impressions and Plan        Chronic insomnia  ELKIN (obstructive sleep apnea)    Patient reports recent improvement in use of CPAP for treatment of sleep apnea.  Discussed the relationship between sleep apnea, attention and concentration.  She has secured a more stable work schedule for the summer to help improve overall sleep habits and hygiene.  Patient realizes she may need a bit more sleep than she thought and has been challenging " herself to allow for sufficient amount of sleep.    Plan:  Continue current sleep schedule and plan    Follow-up: 3 weeks      Helio Arango PsyD, ALBERTO, DBSM  Diplomate, Behavioral Sleep Medicine  [facility]      Note: This dictation was created using voice recognition software. This document may contain an error not identified before finalizing the document. If the error changes the accuracy of the document, I would appreciate it being brought to my attention.

## 2024-06-06 DIAGNOSIS — G47.00 INSOMNIA, UNSPECIFIED TYPE: ICD-10-CM

## 2024-06-06 RX ORDER — TRAZODONE HYDROCHLORIDE 50 MG/1
TABLET, FILM COATED ORAL
Qty: 180 TABLET | Refills: 0 | Status: SHIPPED | OUTPATIENT
Start: 2024-06-06 | End: 2024-06-11

## 2024-06-11 ENCOUNTER — OFFICE VISIT (OUTPATIENT)
Dept: FAMILY MEDICINE | Facility: OTHER | Age: 24
End: 2024-06-11
Payer: COMMERCIAL

## 2024-06-11 VITALS
HEIGHT: 65 IN | OXYGEN SATURATION: 98 % | BODY MASS INDEX: 48.82 KG/M2 | WEIGHT: 293 LBS | DIASTOLIC BLOOD PRESSURE: 76 MMHG | RESPIRATION RATE: 18 BRPM | TEMPERATURE: 97.3 F | HEART RATE: 87 BPM | SYSTOLIC BLOOD PRESSURE: 130 MMHG

## 2024-06-11 DIAGNOSIS — G47.33 OSA (OBSTRUCTIVE SLEEP APNEA): ICD-10-CM

## 2024-06-11 DIAGNOSIS — G47.00 INSOMNIA, UNSPECIFIED TYPE: ICD-10-CM

## 2024-06-11 DIAGNOSIS — F90.0 ATTENTION DEFICIT HYPERACTIVITY DISORDER (ADHD), PREDOMINANTLY INATTENTIVE TYPE: ICD-10-CM

## 2024-06-11 DIAGNOSIS — E55.9 VITAMIN D DEFICIENCY: ICD-10-CM

## 2024-06-11 DIAGNOSIS — E66.813 CLASS 3 SEVERE OBESITY WITH SERIOUS COMORBIDITY AND BODY MASS INDEX (BMI) OF 50.0 TO 59.9 IN ADULT, UNSPECIFIED OBESITY TYPE (H): Primary | ICD-10-CM

## 2024-06-11 DIAGNOSIS — E66.01 CLASS 3 SEVERE OBESITY WITH SERIOUS COMORBIDITY AND BODY MASS INDEX (BMI) OF 50.0 TO 59.9 IN ADULT, UNSPECIFIED OBESITY TYPE (H): Primary | ICD-10-CM

## 2024-06-11 PROCEDURE — 99214 OFFICE O/P EST MOD 30 MIN: CPT | Performed by: PHYSICIAN ASSISTANT

## 2024-06-11 RX ORDER — DEXTROAMPHETAMINE SACCHARATE, AMPHETAMINE ASPARTATE MONOHYDRATE, DEXTROAMPHETAMINE SULFATE AND AMPHETAMINE SULFATE 7.5; 7.5; 7.5; 7.5 MG/1; MG/1; MG/1; MG/1
30 CAPSULE, EXTENDED RELEASE ORAL DAILY
Qty: 30 CAPSULE | Refills: 0 | Status: SHIPPED | OUTPATIENT
Start: 2024-07-11 | End: 2024-08-10

## 2024-06-11 RX ORDER — TRAZODONE HYDROCHLORIDE 50 MG/1
50-100 TABLET, FILM COATED ORAL AT BEDTIME
Qty: 180 TABLET | Refills: 3 | Status: SHIPPED | OUTPATIENT
Start: 2024-06-11

## 2024-06-11 RX ORDER — DEXTROAMPHETAMINE SACCHARATE, AMPHETAMINE ASPARTATE MONOHYDRATE, DEXTROAMPHETAMINE SULFATE AND AMPHETAMINE SULFATE 7.5; 7.5; 7.5; 7.5 MG/1; MG/1; MG/1; MG/1
30 CAPSULE, EXTENDED RELEASE ORAL DAILY
Qty: 30 CAPSULE | Refills: 0 | Status: SHIPPED | OUTPATIENT
Start: 2024-06-11 | End: 2024-07-11

## 2024-06-11 RX ORDER — DEXTROAMPHETAMINE SACCHARATE, AMPHETAMINE ASPARTATE MONOHYDRATE, DEXTROAMPHETAMINE SULFATE AND AMPHETAMINE SULFATE 7.5; 7.5; 7.5; 7.5 MG/1; MG/1; MG/1; MG/1
30 CAPSULE, EXTENDED RELEASE ORAL DAILY
Qty: 30 CAPSULE | Refills: 0 | Status: SHIPPED | OUTPATIENT
Start: 2024-08-10 | End: 2024-09-09

## 2024-06-11 ASSESSMENT — ASTHMA QUESTIONNAIRES
QUESTION_5 LAST FOUR WEEKS HOW WOULD YOU RATE YOUR ASTHMA CONTROL: COMPLETELY CONTROLLED
ACT_TOTALSCORE: 24
QUESTION_2 LAST FOUR WEEKS HOW OFTEN HAVE YOU HAD SHORTNESS OF BREATH: ONCE OR TWICE A WEEK
QUESTION_1 LAST FOUR WEEKS HOW MUCH OF THE TIME DID YOUR ASTHMA KEEP YOU FROM GETTING AS MUCH DONE AT WORK, SCHOOL OR AT HOME: NONE OF THE TIME
QUESTION_4 LAST FOUR WEEKS HOW OFTEN HAVE YOU USED YOUR RESCUE INHALER OR NEBULIZER MEDICATION (SUCH AS ALBUTEROL): NOT AT ALL
QUESTION_3 LAST FOUR WEEKS HOW OFTEN DID YOUR ASTHMA SYMPTOMS (WHEEZING, COUGHING, SHORTNESS OF BREATH, CHEST TIGHTNESS OR PAIN) WAKE YOU UP AT NIGHT OR EARLIER THAN USUAL IN THE MORNING: NOT AT ALL
ACT_TOTALSCORE: 24

## 2024-06-11 ASSESSMENT — PAIN SCALES - GENERAL: PAINLEVEL: NO PAIN (0)

## 2024-06-11 NOTE — PROGRESS NOTES
Assessment & Plan     Class 3 severe obesity with serious comorbidity and body mass index (BMI) of 50.0 to 59.9 in adult, unspecified obesity type (H)  Has been able to lose approximately 7 lbs since starting on Contrave in December 2023. They are noticing help with reduction in appetite and less food talk.  They are working to increase in more nutritious food options, easy to grab and go options and even trying to pick more nutritious snacks when has the option.  They are staying active with their job and walking.  Interested in getting back into the gym, has free [employer] membership through work.    Continue on Contrave for right now, tolerating well.   Will see if we can get Zepbound covered. Was previously on Saxenda and had significant appetite suppression but feel that now they have a better grasp on dietary modifications and eating regularly.  Also less likely to have GI side effects on medication but did remind of potential side effects on injection.  If started will want MyChart update 3-4 weeks after starting.    - tirzepatide-Weight Management (ZEPBOUND) 2.5 MG/0.5ML prefilled pen; Inject 0.5 mLs (2.5 mg) Subcutaneous every 7 days  - naltrexone-bupropion (CONTRAVE) 8-90 MG per 12 hr tablet; Take 1 tablet daily for a week then 1 tablet twice daily for a week then 2 tablets in the AM and 1 tablet in PM for a week then 2 tablets twice daily    ELKIN (obstructive sleep apnea)  Continue with CPAP and sleep medications.   - tirzepatide-Weight Management (ZEPBOUND) 2.5 MG/0.5ML prefilled pen; Inject 0.5 mLs (2.5 mg) Subcutaneous every 7 days    Vitamin D deficiency  Continue on supplementation.   - tirzepatide-Weight Management (ZEPBOUND) 2.5 MG/0.5ML prefilled pen; Inject 0.5 mLs (2.5 mg) Subcutaneous every 7 days    Attention deficit hyperactivity disorder (ADHD), predominantly inattentive type  Stable for the most part. Struggles with motivation to start the activities that need to be done. Thinks it is more due  "to the ADHD not related to depression/anxiety.   Refill for 3 months, will refill for additional 3 months and follow-up virtually or in person in 6 months.   - amphetamine-dextroamphetamine (ADDERALL XR) 30 MG 24 hr capsule; Take 1 capsule (30 mg) by mouth daily for 30 days  - amphetamine-dextroamphetamine (ADDERALL XR) 30 MG 24 hr capsule; Take 1 capsule (30 mg) by mouth daily for 30 days  - amphetamine-dextroamphetamine (ADDERALL XR) 30 MG 24 hr capsule; Take 1 capsule (30 mg) by mouth daily for 30 days    Insomnia, unspecified type  Refilled medication.   - traZODone (DESYREL) 50 MG tablet; Take 1-2 tablets ( mg) by mouth at bedtime          BMI  Estimated body mass index is 54.14 kg/m  as calculated from the following:    Height as of this encounter: 1.659 m (5' 5.32\").    Weight as of this encounter: 149 kg (328 lb 8 oz).   Weight management plan: Discussed healthy diet and exercise guidelines See above.       Options for treatment and follow-up care were reviewed with the patient and/or guardian. Patient and/or guardian engaged in the decision making process and verbalized understanding of the options discussed and agreed with the final plan.      Alexa Saavedra is a 23 year old, presenting for the following health issues:  Recheck Medication        6/11/2024     1:13 PM   Additional Questions   Roomed by srinivas   Accompanied by alone         6/11/2024     1:13 PM   Patient Reported Additional Medications   Patient reports taking the following new medications none     History of Present Illness       Reason for visit:  Obesity/Med Re-check    Keri eats 2-3 servings of fruits and vegetables daily.Keri consumes 1 sweetened beverage(s) daily.Keri exercises with enough effort to increase Keri's heart rate 20 to 29 minutes per day.  Keri exercises with enough effort to increase Keri's heart rate 5 days per week.   Keri is taking medications regularly.         Depression and Anxiety   How " "are you doing with your depression since your last visit? No change  How are you doing with your anxiety since your last visit?  No change  Are you having other symptoms that might be associated with depression or anxiety? No  Have you had a significant life event? Housing Concerns just moved   Do you have any concerns with your use of alcohol or other drugs? No    Social History     Tobacco Use    Smoking status: Never     Passive exposure: Yes    Smokeless tobacco: Never    Tobacco comments:     outside of home   Vaping Use    Vaping status: Never Used   Substance Use Topics    Alcohol use: No     Alcohol/week: 0.0 standard drinks of alcohol    Drug use: No     Comment: no exposure         5/8/2023    10:29 AM 12/18/2023     3:43 PM 6/11/2024     1:08 PM   PHQ   PHQ-9 Total Score 6 8 9   Q9: Thoughts of better off dead/self-harm past 2 weeks Not at all Not at all Not at all         1/16/2023     9:17 AM 5/8/2023    10:30 AM 12/18/2023     3:49 PM   FRANKLYN-7 SCORE   Total Score 4 (minimal anxiety) 2 (minimal anxiety) 5 (mild anxiety)   Total Score 4 2 5         Suicide Assessment Five-step Evaluation and Treatment (SAFE-T)          Review of Systems  Constitutional, HEENT, cardiovascular, pulmonary, GI, , musculoskeletal, neuro, skin, endocrine and psych systems are negative, except as otherwise noted.      Objective    /76   Pulse 87   Temp 97.3  F (36.3  C) (Temporal)   Resp 18   Ht 1.659 m (5' 5.32\")   Wt 149 kg (328 lb 8 oz)   LMP 05/22/2024 (Exact Date)   SpO2 98%   BMI 54.14 kg/m    Body mass index is 54.14 kg/m .  Physical Exam   GENERAL: alert and no distress  RESP: lungs clear to auscultation - no rales, rhonchi or wheezes  CV: regular rate and rhythm, normal S1 S2, no S3 or S4, no murmur, click or rub, no peripheral edema  MS: no gross musculoskeletal defects noted, no edema  PSYCH: mentation appears normal, affect normal/bright            Signed Electronically by: Tiffanie Sequeira PA-C    "

## 2024-06-12 ENCOUNTER — TELEPHONE (OUTPATIENT)
Dept: FAMILY MEDICINE | Facility: OTHER | Age: 24
End: 2024-06-12
Payer: COMMERCIAL

## 2024-06-12 NOTE — LETTER
June 14, 2024      Tami Rai  [address]  [facility] [place] [address]        To Whom It May Concern,     Tami Rai has been on Contrave at maximum dose since January 2024 and has only been able to lose 7 lbs < 3% of her body weight since start of medication.  She has also worked on both nutrition and exercise along side of medication.  Feel that they would benefit greatly from use of Tirzepatide. Previously was on Saxenda and had gastrointestinal side effects and feel that Tirzepatide might offer them less of these side effects specifically.           Sincerely,        Tiffanie Sequeira PA-C

## 2024-06-13 NOTE — TELEPHONE ENCOUNTER
PRIOR AUTHORIZATION DENIED    Medication: TIRZEPATIDE-WEIGHT MANAGEMENT 2.5 MG/0.5ML SC SOAJ  Insurance Company: [employer] [place] - Phone [phone] Fax [phone]  Denial Date: 6/12/2024  Denial Reason(s):     Appeal Information:     Patient Notified: No

## 2024-06-14 NOTE — TELEPHONE ENCOUNTER
Please fax Appeal and follow-up on this to make sure insurance receives it.     Tiffanie Sequeira PA-C

## 2024-06-23 NOTE — TELEPHONE ENCOUNTER
Medication Appeal Initiation    Medication: TIRZEPATIDE-WEIGHT MANAGEMENT 2.5 MG/0.5ML SC SOAJ  Appeal Start Date:  6/23/2024  Insurance Company: [employer] [place] - Phone [phone] Fax [phone]   Insurance Phone:   Insurance Fax: [phone]  Comments:

## 2024-07-22 ENCOUNTER — MYC REFILL (OUTPATIENT)
Dept: FAMILY MEDICINE | Facility: OTHER | Age: 24
End: 2024-07-22
Payer: COMMERCIAL

## 2024-07-22 DIAGNOSIS — F90.0 ATTENTION DEFICIT HYPERACTIVITY DISORDER (ADHD), PREDOMINANTLY INATTENTIVE TYPE: ICD-10-CM

## 2024-07-22 RX ORDER — DEXTROAMPHETAMINE SACCHARATE, AMPHETAMINE ASPARTATE MONOHYDRATE, DEXTROAMPHETAMINE SULFATE AND AMPHETAMINE SULFATE 7.5; 7.5; 7.5; 7.5 MG/1; MG/1; MG/1; MG/1
30 CAPSULE, EXTENDED RELEASE ORAL DAILY
Qty: 30 CAPSULE | Refills: 0 | OUTPATIENT
Start: 2024-07-22

## 2024-08-13 ENCOUNTER — VIRTUAL VISIT (OUTPATIENT)
Dept: SLEEP MEDICINE | Facility: CLINIC | Age: 24
End: 2024-08-13
Payer: COMMERCIAL

## 2024-08-13 VITALS — WEIGHT: 293 LBS | BODY MASS INDEX: 48.82 KG/M2 | HEIGHT: 65 IN

## 2024-08-13 DIAGNOSIS — F51.04 CHRONIC INSOMNIA: Primary | ICD-10-CM

## 2024-08-13 DIAGNOSIS — G47.33 OSA (OBSTRUCTIVE SLEEP APNEA): ICD-10-CM

## 2024-08-13 PROCEDURE — 90832 PSYTX W PT 30 MINUTES: CPT | Mod: 95 | Performed by: PSYCHOLOGIST

## 2024-08-13 ASSESSMENT — SLEEP AND FATIGUE QUESTIONNAIRES
HOW LIKELY ARE YOU TO NOD OFF OR FALL ASLEEP WHILE SITTING INACTIVE IN A PUBLIC PLACE: WOULD NEVER DOZE
HOW LIKELY ARE YOU TO NOD OFF OR FALL ASLEEP WHILE SITTING AND READING: SLIGHT CHANCE OF DOZING
HOW LIKELY ARE YOU TO NOD OFF OR FALL ASLEEP WHILE SITTING AND TALKING TO SOMEONE: WOULD NEVER DOZE
HOW LIKELY ARE YOU TO NOD OFF OR FALL ASLEEP IN A CAR, WHILE STOPPED FOR A FEW MINUTES IN TRAFFIC: WOULD NEVER DOZE
HOW LIKELY ARE YOU TO NOD OFF OR FALL ASLEEP WHILE WATCHING TV: MODERATE CHANCE OF DOZING
HOW LIKELY ARE YOU TO NOD OFF OR FALL ASLEEP WHEN YOU ARE A PASSENGER IN A CAR FOR AN HOUR WITHOUT A BREAK: HIGH CHANCE OF DOZING
HOW LIKELY ARE YOU TO NOD OFF OR FALL ASLEEP WHILE SITTING QUIETLY AFTER LUNCH WITHOUT ALCOHOL: WOULD NEVER DOZE
HOW LIKELY ARE YOU TO NOD OFF OR FALL ASLEEP WHILE LYING DOWN TO REST IN THE AFTERNOON WHEN CIRCUMSTANCES PERMIT: HIGH CHANCE OF DOZING

## 2024-08-13 ASSESSMENT — PAIN SCALES - GENERAL: PAINLEVEL: NO PAIN (0)

## 2024-08-13 NOTE — NURSING NOTE
Current patient location: [address] [facility] [address]  [facility] [facility] [address]    Is the patient currently in the state of [place]? YES    Visit mode:VIDEO    If the visit is dropped, the patient can be reconnected by: VIDEO VISIT: Text to cell phone:   Telephone Information:   Mobile [phone]       Will anyone else be joining the visit? NO  (If patient encounters technical issues they should call [phone]    How would you like to obtain your AVS? MyChart    Are changes needed to the allergy or medication list? No    Are refills needed on medications prescribed by this physician? NO    Rooming Documentation:  Not applicable      Reason for visit: DINO Burdick VVF

## 2024-08-13 NOTE — PROGRESS NOTES
Virtual Visit Details    Type of service:  Video Visit   Originating Location (pt. Location): Home    Distant Location (provider location):  Off-site  Platform used for Video Visit: AmWell    Visit Start Time: 3:24 PM  Visit End Time: 3:44 PM        SLEEP MEDICINE VIRTUAL FOLLOW-UP VISIT  Behavioral Sleep Medicine    Patient Name: Tami Rai  MRN:  [id number]  Date of Service: Aug 13, 2024       Subjective Report     Tami Rai  returns for a telehealth video visit to discuss progress in implementing behavioral strategies for the management of insomnia.  Patient consent for initiation of video visit was obtained and documented prior to initiation of visit.     Tami reports significant life events.including moving and several bouts of illness has affected sleep quality and implementing her behavioral plan. She notes she did not use her CPAP for about 1.5 months.  She is finding her mask uncomfortable.    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       Initial Recommendations.     1.  On workdays, establish a consistent 9-hour sleep schedule between 8-5 AM     2.  On alternate days, keep a 11 PM-8 AM sleep schedule.     3.  In the morning, it is important that you get exposure to bright light.  Sit in your window, use your computer, take your medications and set your alarm clock away from your bed so that you have to get up to turn it off.     4.  Try to discontinue use of screens and devices at least an hour before bed and initiate a relaxing routine.     5.  Reading reviewed the relaxation module which discusses strategies to relax before bed and a strategy to help manage unwanted worry.        Sleep Data:     Source of Sleep Estimates:  Verbal Self-report      EPWORTH SLEEPINESS SCALE    Sitting and reading 1   Watching TV 2   Sitting,  "inactive in a public place (theatre or mtg.) 0    As a passenger in a car 3   Lying down to rest in the afternoon when circumstance permit 3   Sitting and talking to someone 0   Sitting quietly after lunch without alcohol 0   In a car, while stopped for a few minutes in traffic 0   TOTAL SCORE (nl <11) 9     INSOMNIA SEVERITY INDEX     Difficulty falling asleep 4   Difficult staying asleep 1   Problems waking up to early 0   How SATISFIED/DISSATISFIED are you with your CURRENT sleep pattern? 3   How NOTICEABLE to others do you think your sleep pattern is in terms of your quality of life? 2   How WORRIED/DISTRESSED are you about your current sleep pattern? 3   To what extent do you consider your sleep problem to INTERFERE with your daily fuctioning(e.g. daytime fatigue, mood, ability to function at work/daily chores, concentration, mood,etc.) CURRENTLY? 3   INSOMNIA SEVERITY INDEX TOTAL SCORE 16    Absence of insomnia (0-7); sub-threshold insomnia (8-14); moderate insomnia (15-21); and severe insomnia (22-28)       Interventions     Strategies and recommendations including Stimulus control therapy and Circadian rhythm disorder treatment and management were reviewed and discussed today.     Services provided are compliant with the requirements of [place] Statute SS 256B.0625 Subd. 3b and paragraph (b)       Vital Signs     Ht 1.651 m (5' 5\")   Wt 149.7 kg (330 lb)   BMI 54.91 kg/m       Mental Status     Orientation:  X3  Mood:  normal  Affect:  Congruent with mood  Speech/Language:  Normal  Thought Process: Intact  Associations:  Normal  Thought Content: Normal  Patient does not report any suicidal ideation, intention or plan.    Diagnostic Impressions and Plan        Chronic insomnia  ELKIN (obstructive sleep apnea)    Patient reports that upcoming work/school schedules will align well to achieve a consistent wake time between 5-6 AM.  Recent summer stressors affecting ability to implement behavioral sleep plan.  " She is reporting that she is having poor mask fit and some irritation.  She is advised to follow-up with [facility] to consider a different mask.    Plan:  Continue current sleep schedule and plan    Follow-up: 3 months      Helio Arango PsyD, ALBERTO, DBSM  Diplomate, Behavioral Sleep Medicine  [facility]      Note: This dictation was created using voice recognition software. This document may contain an error not identified before finalizing the document. If the error changes the accuracy of the document, I would appreciate it being brought to my attention.

## 2024-09-05 DIAGNOSIS — F90.0 ATTENTION DEFICIT HYPERACTIVITY DISORDER (ADHD), PREDOMINANTLY INATTENTIVE TYPE: Primary | ICD-10-CM

## 2024-09-05 RX ORDER — DEXTROAMPHETAMINE SACCHARATE, AMPHETAMINE ASPARTATE MONOHYDRATE, DEXTROAMPHETAMINE SULFATE AND AMPHETAMINE SULFATE 7.5; 7.5; 7.5; 7.5 MG/1; MG/1; MG/1; MG/1
30 CAPSULE, EXTENDED RELEASE ORAL DAILY
Qty: 30 CAPSULE | Refills: 0 | Status: SHIPPED | OUTPATIENT
Start: 2024-09-05 | End: 2024-10-05

## 2024-09-05 RX ORDER — DEXTROAMPHETAMINE SACCHARATE, AMPHETAMINE ASPARTATE MONOHYDRATE, DEXTROAMPHETAMINE SULFATE AND AMPHETAMINE SULFATE 7.5; 7.5; 7.5; 7.5 MG/1; MG/1; MG/1; MG/1
30 CAPSULE, EXTENDED RELEASE ORAL DAILY
Qty: 30 CAPSULE | Refills: 0 | Status: SHIPPED | OUTPATIENT
Start: 2024-11-04 | End: 2024-12-04

## 2024-09-05 RX ORDER — DEXTROAMPHETAMINE SACCHARATE, AMPHETAMINE ASPARTATE MONOHYDRATE, DEXTROAMPHETAMINE SULFATE AND AMPHETAMINE SULFATE 7.5; 7.5; 7.5; 7.5 MG/1; MG/1; MG/1; MG/1
30 CAPSULE, EXTENDED RELEASE ORAL DAILY
Qty: 30 CAPSULE | Refills: 0 | Status: SHIPPED | OUTPATIENT
Start: 2024-10-05 | End: 2024-11-04

## 2024-11-15 ENCOUNTER — VIRTUAL VISIT (OUTPATIENT)
Dept: SLEEP MEDICINE | Facility: CLINIC | Age: 24
End: 2024-11-15
Payer: COMMERCIAL

## 2024-11-15 VITALS — BODY MASS INDEX: 48.82 KG/M2 | WEIGHT: 293 LBS | HEIGHT: 65 IN

## 2024-11-15 DIAGNOSIS — F51.04 CHRONIC INSOMNIA: Primary | ICD-10-CM

## 2024-11-15 DIAGNOSIS — G47.33 OSA (OBSTRUCTIVE SLEEP APNEA): ICD-10-CM

## 2024-11-15 PROCEDURE — 90832 PSYTX W PT 30 MINUTES: CPT | Mod: 95 | Performed by: PSYCHOLOGIST

## 2024-11-15 ASSESSMENT — SLEEP AND FATIGUE QUESTIONNAIRES
HOW LIKELY ARE YOU TO NOD OFF OR FALL ASLEEP WHILE SITTING AND TALKING TO SOMEONE: SLIGHT CHANCE OF DOZING
HOW LIKELY ARE YOU TO NOD OFF OR FALL ASLEEP WHILE LYING DOWN TO REST IN THE AFTERNOON WHEN CIRCUMSTANCES PERMIT: HIGH CHANCE OF DOZING
HOW LIKELY ARE YOU TO NOD OFF OR FALL ASLEEP IN A CAR, WHILE STOPPED FOR A FEW MINUTES IN TRAFFIC: WOULD NEVER DOZE
HOW LIKELY ARE YOU TO NOD OFF OR FALL ASLEEP WHEN YOU ARE A PASSENGER IN A CAR FOR AN HOUR WITHOUT A BREAK: HIGH CHANCE OF DOZING
HOW LIKELY ARE YOU TO NOD OFF OR FALL ASLEEP WHILE SITTING INACTIVE IN A PUBLIC PLACE: SLIGHT CHANCE OF DOZING
HOW LIKELY ARE YOU TO NOD OFF OR FALL ASLEEP WHILE SITTING AND READING: SLIGHT CHANCE OF DOZING
HOW LIKELY ARE YOU TO NOD OFF OR FALL ASLEEP WHILE WATCHING TV: HIGH CHANCE OF DOZING
HOW LIKELY ARE YOU TO NOD OFF OR FALL ASLEEP WHILE SITTING QUIETLY AFTER LUNCH WITHOUT ALCOHOL: SLIGHT CHANCE OF DOZING

## 2024-11-15 ASSESSMENT — PAIN SCALES - GENERAL: PAINLEVEL_OUTOF10: MILD PAIN (2)

## 2024-11-15 NOTE — NURSING NOTE
Patient declined phq Current patient location: [address]  [facility] [address]    Is the patient currently in the state of [place]? YES    Visit mode:VIDEO    If the visit is dropped, the patient can be reconnected by:VIDEO VISIT: Text to cell phone:   Telephone Information:   Mobile [phone]       Will anyone else be joining the visit? NO  (If patient encounters technical issues they should call [phone]    Are changes needed to the allergy or medication list? No    Are refills needed on medications prescribed by this physician? NO    Rooming Documentation:  Questionnaire(s) completed    Reason for visit: RECHECK    Al MARSHF

## 2024-11-15 NOTE — PROGRESS NOTES
"Virtual Visit Details    Type of service:  Video Visit   Originating Location (pt. Location): Home    Distant Location (provider location): On site  Platform used for Video Visit: yWorld Start Time: 3:29 PM  Visit End Time: 3:58 PM        SLEEP MEDICINE VIRTUAL FOLLOW-UP VISIT  Behavioral Sleep Medicine    Patient Name: Tami Rai  MRN:  [id number]  Date of Service: Nov 15, 2024       Subjective Report     Tami Rai  returns for a telehealth video visit to discuss progress in implementing behavioral strategies for the management of insomnia.  Patient consent for initiation of video visit was obtained and documented prior to initiation of visit.     Tami reports having challenges to using CPAP nightly as reflected in her download CPAP data.  Discussed behavioral strategies to promptly use at night.    She is also addressing low iron and Vitamin D3 which she feels contributes to her daytime fatigue.  '  Tami indicates increased adherence to recommended wake time and sleep window.     Discussed work related stressors and \"burnout.\"  In her job.    .          Sleep Data:     Source of Sleep Estimates:  Verbal Self-report        EPWORTH SLEEPINESS SCALE    Sitting and reading (Patient-Rptd) 1   Watching TV (Patient-Rptd) 3   Sitting, inactive in a public place (theatre or mtg.) (Patient-Rptd) 1    As a passenger in a car (Patient-Rptd) 3   Lying down to rest in the afternoon when circumstance permit (Patient-Rptd) 3   Sitting and talking to someone (Patient-Rptd) 1   Sitting quietly after lunch without alcohol (Patient-Rptd) 1   In a car, while stopped for a few minutes in traffic (Patient-Rptd) 0   TOTAL SCORE (nl <11) (Patient-Rptd) 13     INSOMNIA SEVERITY INDEX     Difficulty falling asleep (Patient-Rptd) 2   Difficult staying asleep (Patient-Rptd) 1   Problems waking up to early (Patient-Rptd) 0   How SATISFIED/DISSATISFIED are you with your CURRENT sleep pattern? (Patient-Rptd) 3   How NOTICEABLE to " others do you think your sleep pattern is in terms of your quality of life? (Patient-Rptd) 2   How WORRIED/DISTRESSED are you about your current sleep pattern? (Patient-Rptd) 3   To what extent do you consider your sleep problem to INTERFERE with your daily fuctioning(e.g. daytime fatigue, mood, ability to function at work/daily chores, concentration, mood,etc.) CURRENTLY? (Patient-Rptd) 4   INSOMNIA SEVERITY INDEX TOTAL SCORE (Patient-Rptd) 15    Absence of insomnia (0-7); sub-threshold insomnia (8-14); moderate insomnia (15-21); and severe insomnia (22-28)       Interventions     Strategies and recommendations including Sleep hygiene training and PAP adherence were reviewed and discussed today.     Services provided are compliant with the requirements of [place] Statute SS 256B.0625 Subd. 3b and paragraph (b)       Vital Signs     There were no vitals taken for this visit.     Mental Status     Orientation:  X3  Mood:  normal  Affect:  Congruent with mood  Speech/Language:  Normal  Thought Process: Intact  Associations:  Normal  Thought Content: Normal  Patient does not report any suicidal ideation, intention or plan.    Diagnostic Impressions and Plan        Chronic insomnia  ELKIN (obstructive sleep apnea)    Reviewed CPAP adherence issues and behavioral strategies to increased adherence.    Discuss adherence to sleep schedule with delayed sleep phase component.      Plan:  Continue current sleep schedule and plan , strategies to improve CPAP use on a nightly basis.    Follow-up: 2 months      Helio Arango PsyD, ALBERTO, [facility]  Diplomate, Behavioral Sleep Medicine  [facility]      Note: This dictation was created using voice recognition software. This document may contain an error not identified before finalizing the document. If the error changes the accuracy of the document, I would appreciate it being brought to my attention.

## 2024-11-18 ENCOUNTER — PATIENT OUTREACH (OUTPATIENT)
Dept: CARE COORDINATION | Facility: CLINIC | Age: 24
End: 2024-11-18

## 2024-11-18 ENCOUNTER — VIRTUAL VISIT (OUTPATIENT)
Dept: FAMILY MEDICINE | Facility: OTHER | Age: 24
End: 2024-11-18
Payer: COMMERCIAL

## 2024-11-18 DIAGNOSIS — R68.89 SUSPECTED AUTISM DISORDER: ICD-10-CM

## 2024-11-18 DIAGNOSIS — E66.813 CLASS 3 SEVERE OBESITY WITH SERIOUS COMORBIDITY AND BODY MASS INDEX (BMI) OF 50.0 TO 59.9 IN ADULT, UNSPECIFIED OBESITY TYPE (H): ICD-10-CM

## 2024-11-18 DIAGNOSIS — E66.01 CLASS 3 SEVERE OBESITY WITH SERIOUS COMORBIDITY AND BODY MASS INDEX (BMI) OF 50.0 TO 59.9 IN ADULT, UNSPECIFIED OBESITY TYPE (H): ICD-10-CM

## 2024-11-18 DIAGNOSIS — F41.1 GAD (GENERALIZED ANXIETY DISORDER): ICD-10-CM

## 2024-11-18 DIAGNOSIS — G47.33 OSA (OBSTRUCTIVE SLEEP APNEA): ICD-10-CM

## 2024-11-18 DIAGNOSIS — R11.0 NAUSEA: ICD-10-CM

## 2024-11-18 DIAGNOSIS — G47.00 INSOMNIA, UNSPECIFIED TYPE: Primary | ICD-10-CM

## 2024-11-18 DIAGNOSIS — F33.2 SEVERE EPISODE OF RECURRENT MAJOR DEPRESSIVE DISORDER, WITHOUT PSYCHOTIC FEATURES (H): ICD-10-CM

## 2024-11-18 DIAGNOSIS — E55.9 VITAMIN D DEFICIENCY: ICD-10-CM

## 2024-11-18 DIAGNOSIS — F90.0 ATTENTION DEFICIT HYPERACTIVITY DISORDER (ADHD), PREDOMINANTLY INATTENTIVE TYPE: ICD-10-CM

## 2024-11-18 PROCEDURE — 99214 OFFICE O/P EST MOD 30 MIN: CPT | Mod: 95 | Performed by: PHYSICIAN ASSISTANT

## 2024-11-18 RX ORDER — DESVENLAFAXINE 100 MG/1
100 TABLET, EXTENDED RELEASE ORAL DAILY
Qty: 90 TABLET | Refills: 3 | Status: SHIPPED | OUTPATIENT
Start: 2024-11-18

## 2024-11-18 RX ORDER — LISDEXAMFETAMINE DIMESYLATE 10 MG/1
10 CAPSULE ORAL EVERY MORNING
Qty: 30 CAPSULE | Refills: 0 | Status: SHIPPED | OUTPATIENT
Start: 2024-11-18

## 2024-11-18 RX ORDER — TRAZODONE HYDROCHLORIDE 50 MG/1
100-150 TABLET, FILM COATED ORAL AT BEDTIME
Qty: 180 TABLET | Refills: 3 | Status: SHIPPED | OUTPATIENT
Start: 2024-11-18

## 2024-11-18 RX ORDER — ONDANSETRON 4 MG/1
4 TABLET, FILM COATED ORAL EVERY 8 HOURS PRN
Qty: 12 TABLET | Refills: 0 | Status: SHIPPED | OUTPATIENT
Start: 2024-11-18

## 2024-11-18 ASSESSMENT — PATIENT HEALTH QUESTIONNAIRE - PHQ9
10. IF YOU CHECKED OFF ANY PROBLEMS, HOW DIFFICULT HAVE THESE PROBLEMS MADE IT FOR YOU TO DO YOUR WORK, TAKE CARE OF THINGS AT HOME, OR GET ALONG WITH OTHER PEOPLE: VERY DIFFICULT
SUM OF ALL RESPONSES TO PHQ QUESTIONS 1-9: 11
SUM OF ALL RESPONSES TO PHQ QUESTIONS 1-9: 11

## 2024-11-18 NOTE — PROGRESS NOTES
Keri is a 24 year old who is being evaluated via a billable video visit.    How would you like to obtain your AVS? MyChart  If the video visit is dropped, the invitation should be resent by: Text to cell phone: [phone]  Will anyone else be joining your video visit? No      Assessment & Plan     Insomnia, unspecified type  ELKIN (obstructive sleep apnea)  Struggling with sleep  Encouraged doing sleep hygiene techniques to help with anxiety and stress as well  Increased Trazodone to 150 mg nightly. They are aware of the risks with both Desvenlafaxine + Trazodone for serotonin syndrome.   - traZODone (DESYREL) 50 MG tablet; Take 2-3 tablets (100-150 mg) by mouth at bedtime.  - tirzepatide-Weight Management (ZEPBOUND) 2.5 MG/0.5ML prefilled pen; Inject 0.5 mLs (2.5 mg) subcutaneously every 7 days.    Suspected autism disorder  Severe episode of recurrent major depressive disorder, without psychotic features (H)  FRANKLYN (generalized anxiety disorder)  Continue on medications.   They have concerns about possible Autism spectrum disorder  Referral placed for evaluation.   Will want to find  a counselor if positive for Autism who specializes with LGBTQ+ population and non binary individuals.   - desvenlafaxine (PRISTIQ) 100 MG 24 hr tablet; Take 1 tablet (100 mg) by mouth daily.  - Adult Mental Health  Referral; Future    Attention deficit hyperactivity disorder (ADHD), predominantly inattentive type  Will see if Vyvanse is covered and see if this helps out more with ADHD, hopefully less fall out effect and maybe more effective to help with focus and less overstimulation.   - Adult Mental Health  Referral; Future  - lisdexamfetamine (VYVANSE) 10 MG capsule; Take 1 capsule (10 mg) by mouth every morning.    Class 3 severe obesity with serious comorbidity and body mass index (BMI) of 50.0 to 59.9 in adult, unspecified obesity type (H)  Vitamin D deficiency  Insurance was blocking the continuation of lower  dose. Will resent to see if they will approve it, will appeal if needed due to severe nausea that was starting to improve at the end of 4th week previously but would want to continue on lower dose. They were noticing benefits on medication.  - tirzepatide-Weight Management (ZEPBOUND) 2.5 MG/0.5ML prefilled pen; Inject 0.5 mLs (2.5 mg) subcutaneously every 7 days.    Nausea   Use PRN for the nausea related to Tirzepatide.   - ondansetron (ZOFRAN) 4 MG tablet; Take 1 tablet (4 mg) by mouth every 8 hours as needed for nausea or vomiting.      Options for treatment and follow-up care were reviewed with the patient and/or guardian. Patient and/or guardian engaged in the decision making process and verbalized understanding of the options discussed and agreed with the final plan.      Subjective   Keri is a 24 year old, presenting for the following health issues:  No chief complaint on file.      Via the Health Maintenance questionnaire, the patient has reported the following services have been completed -Chlamydia: [facility] [place] 2023-12-13, this information has not been sent to the abstraction team.  Video Start Time: 8:15 AM    History of Present Illness       Reason for visit:  I feel like I could have autism, and I would like to discuss getting a referral to be evaluated. I relate to a plethora of autistic symptoms, especially those of LGBT+, otherwise neurodivergent, and AFAB people.   Keri is taking medications regularly.     - Starting senior year of school.   - Still working same job, did move this year.   - Sleep - having a harder time, they are taking 100 mg Trazodone and 5 mg Melatonin.  Would like to increase to 150 mg if possible.   - Still taking Pristiq and Adderall, still feeling benefits of medication.   - Sister has diagnosis of autism.  Keri thinks that mom might also have similar issues and thinks dad has very rigid mindset, hyper fixation.  Also has a cousin who is autistic.   - Keri has  noticed maybe a lack of support that they need , struggling to maintain some general activities of daily living, can feel very overstimulated, feels like all they can do at the end of the day is just fall into bed.        Review of Systems  Constitutional, HEENT, cardiovascular, pulmonary, GI, , musculoskeletal, neuro, skin, endocrine and psych systems are negative, except as otherwise noted.      Objective           Vitals:  No vitals were obtained today due to virtual visit.    Physical Exam   GENERAL: alert and no distress  EYES: Eyes grossly normal to inspection.  No discharge or erythema, or obvious scleral/conjunctival abnormalities.  RESP: No audible wheeze, cough, or visible cyanosis.    SKIN: Visible skin clear. No significant rash, abnormal pigmentation or lesions.  NEURO: Cranial nerves grossly intact.  Mentation and speech appropriate for age.  PSYCH: Appropriate affect, tone, and pace of words          Video-Visit Details    Type of service:  Video Visit   Video End Time:8:35 AM  Originating Location (pt. Location): Home    Distant Location (provider location):  Off-site  Platform used for Video Visit: Domenico  Signed Electronically by: Tiffanie Sequeira PA-C

## 2024-12-02 ENCOUNTER — PATIENT OUTREACH (OUTPATIENT)
Dept: CARE COORDINATION | Facility: CLINIC | Age: 24
End: 2024-12-02
Payer: COMMERCIAL

## 2024-12-12 ENCOUNTER — TELEPHONE (OUTPATIENT)
Dept: FAMILY MEDICINE | Facility: OTHER | Age: 24
End: 2024-12-12
Payer: COMMERCIAL

## 2024-12-12 NOTE — TELEPHONE ENCOUNTER
desvenlafaxine (PRISTIQ) 100 MG 24 hr tablet     Prior Authorization Retail Medication Request    Medication/Dose: desvenlafaxine (PRISTIQ) 100 MG 24 hr tablet  Diagnosis and ICD code (if different than what is on RX):    New/renewal/insurance change PA/secondary ins. PA:  Previously Tried and Failed:    Rationale:      Insurance   Primary:   Insurance ID:      Secondary (if applicable):  Insurance ID:      Pharmacy Information (if different than what is on RX)  Name:    Phone:    Fax:    Clinic Information  Preferred routing pool for dept communication:

## 2024-12-16 NOTE — TELEPHONE ENCOUNTER
Retail Pharmacy Prior Authorization Team   Phone: [phone]    PA Initiation    Medication: DESVENLAFAXINE  MG PO TB24  Insurance Company: [place] Medicaid ([facility]) - Phone [phone] Fax [phone]  Pharmacy Filling the Rx: [facility] [address] [place] [address]  Filling Pharmacy Phone: [phone]  Filling Pharmacy Fax:    Start Date: 12/16/2024    DEJA OSHEA (Gallo: [id number])

## 2024-12-17 NOTE — TELEPHONE ENCOUNTER
Prior Authorization Approval    Medication: DESVENLAFAXINE  MG PO TB24  Authorization Effective Date: 12/16/2024  Authorization Expiration Date: 12/16/2025  Insurance Company: [place] Medicaid ([facility]) - Phone [phone] Fax [phone]  Which Pharmacy is filling the prescription: [facility] [address] [place] [address]  Pharmacy Notified: YES  Patient Notified: YES (faxed approval letter to pharmacy and notified patient via SiSenset message)

## 2025-02-02 ENCOUNTER — HEALTH MAINTENANCE LETTER (OUTPATIENT)
Age: 25
End: 2025-02-02

## 2025-04-01 ENCOUNTER — TELEPHONE (OUTPATIENT)
Dept: PLASTIC SURGERY | Facility: CLINIC | Age: 25
End: 2025-04-01
Payer: COMMERCIAL

## 2025-04-01 NOTE — CONFIDENTIAL NOTE
Writer [facility] re: need to reschedule top surgery consult with Dr. Fischer as he no longer takes new patients for double incision mastectomy. Writer informed pt via VM that they will try to reach them again but will eventually need to cancel the consult. Pt to call back if they want to reschedule with Dr. Harding.

## 2025-05-13 NOTE — TELEPHONE ENCOUNTER
FUTURE VISIT INFORMATION      FUTURE VISIT INFORMATION:  Date: 5/19/25  Time: 1:00  Location: [facility]  REFERRAL INFORMATION:  Referring provider:    Referring providers clinic:    Reason for visit/diagnosis  Top surgery - mastectomy, per patient. Last seen Dr. Blanco on 3/10/23; records in [facility], per patient. - pt needs to R/S, Dr. SAMSON doesn't see new pts for mastectomy - [place] 3/25     RECORDS REQUESTED FROM:       Clinic name Comments Records Status Imaging Status

## 2025-05-19 ENCOUNTER — PRE VISIT (OUTPATIENT)
Dept: PLASTIC SURGERY | Facility: CLINIC | Age: 25
End: 2025-05-19
Payer: COMMERCIAL

## 2025-05-19 ENCOUNTER — OFFICE VISIT (OUTPATIENT)
Dept: PLASTIC SURGERY | Facility: CLINIC | Age: 25
End: 2025-05-19
Payer: COMMERCIAL

## 2025-05-19 VITALS
HEART RATE: 75 BPM | HEIGHT: 65 IN | DIASTOLIC BLOOD PRESSURE: 88 MMHG | BODY MASS INDEX: 48.82 KG/M2 | SYSTOLIC BLOOD PRESSURE: 136 MMHG | OXYGEN SATURATION: 97 % | WEIGHT: 293 LBS

## 2025-05-19 DIAGNOSIS — F64.0 GENDER DYSPHORIA IN ADULT: Primary | ICD-10-CM

## 2025-05-19 ASSESSMENT — PATIENT HEALTH QUESTIONNAIRE - PHQ9: SUM OF ALL RESPONSES TO PHQ QUESTIONS 1-9: 15

## 2025-05-19 ASSESSMENT — PAIN SCALES - GENERAL: PAINLEVEL_OUTOF10: MILD PAIN (2)

## 2025-05-19 NOTE — LETTER
"5/19/2025       RE: Tami Rai  [address]  [place] [address]     Dear Colleague,    Thank you for referring your patient, Tami Rai, to the [facility] PLASTIC AND RECONSTRUCTIVE SURGERY CLINIC [place] at [facility]. Please see a copy of my visit note below.    PLASTICS NEW TOP   HPI: This is a 24 year old non-binary adult with a history of gender dysphoria who presents today for a consultation for top surgery. Their pronouns are they/them and their preferred name is Keri. They had a consult with Dr. Blanco in 2023 but \"life happened\" so they weren't able to get a letter of support before Francis had decided to leave the practice. They were originally referred by their PCP. Today's appointment was originally scheduled for August 2026 but was luckily moved up to today due to waitlist. Their therapist is Dr. Lien Garza from "[employer] x6 months who has written them a letter of support in January 2024, which they will upload via THUBIT. They are not on testosterone and are hesitant to start since they're worried about changes in their voice as they are a . They have been living their chosen gender identity/role for 7ish years. They do not bind secondary to asthma and other reasons. No breast lumps, skin puckering, nipple drainage, or other breast problems. No history of breast imaging, including mammogram or breast ultrasound.     Medical Hx: ELKIN with CPAP but struggles to use it consistently secondary to difficulty with mask fit and sensory issues. Intermittent/exercise induced asthma - has rescue inhaler. IBS. SIBO test pending. Food sensitivities. HSD (possibly hEDS). POTS, MCAS, etc. testing is pending. C. Diff history.    No history of diabetes mellitus or GERD. No bleeding, clotting, healing or scarring problems.    Mental Hx: Gender dysphoria. MDD, FRANKLYN, ADHD - predominantly inattentive type, ASD. " "Question C-PTSD.    Surgical Hx: 3rd molar removal. Colonoscopy. No adverse effects from anesthesia.     Family Hx: PGM and paternal aunt with some sort of gyn cancer, both are . PGM also history of multiple CVAs and  around age 90. Mom had a DVT, etiology unknown. Dad with kidney stones and lymphoma in remission x1 year. 2 siblings medically healthy. No family history of breast cancer.     Social Hx:   Occupation: Has to look for a new job soon as current company is closing, student at [employer]   Relationship status/family: Lives in [place] with 2 cats; from [place] originally. Plans to either stay with parents and/or professor/mentor post-op.   Smoking status: Rare, edibles only.   Alcohol use: Rare  Diet: history of vegetarianism, back to omnivore as of past year or so.  Caffeine: Occasional, has a personal limit of 200mg daily but usually does not have caffeine unless really needed    Exercise: No regular form. Long term goal of increasing regular movement for overall health improvement.   Sleep: Averages 10-12 hours. Takes trazodone 150mg for sleep with inconsistent relief, planning to look at other options.     PE: General: Height: 5' 5.32\" Weight: 350 lbs 0 oz BMI:Body mass index is 57.67 kg/m . MO.   Chest:   Nice upper chest contour.   Grade 3 nipple ptosis. Incisions likely to meet in the middle. Faint striae.  Left breast (>1kg) is slightly larger than the right (>1kg).   IMFs situated about 2 cms below the pec muscle.   Right IMF situated about 0 cms lower than the left.   Moderate lateral thoracic roll.   Moderate anterior axillary breast tissue.    No lymphadenopathy or masses.   Photos taken with consent.     A&P: 24 year old non-binary adult who is a good candidate for gender affirming top surgery. They will most likely need a bilateral simple double-incision mastectomy WITHOUT nipple graft reconstruction depending on intraoperative findings and the patient's desired outcome. The " patient is not interested in nipple grafts. The patient will NOT need a pre-op mammogram in addition to an H&P from their PCP.   Would prefer to schedule over their school's winter break 2025.     They did meet with our Transgender , David. They did receive an introductory folder of information and contact numbers/names. Any further discussion of risks and complications will be reviewed during the pre-op visit.    Patient accepts the risks of this procedure and would like to proceed with surgery. They are able to give informed consent for this medically necessary procedure. While every effort will be made to mitigate gender dysphoria with a first-stage mastectomy, the patient may also require a possible second-stage procedure to address residual deformities causing persistent dysphoria.   Has MA for health insurance which should be fine throughout the job transition.     Once we receive and review their therapist letter of support we will initiate the prior authorization process.     Patient was counseled regarding the requirement to abstain from using nicotine products within at least 6 weeks before surgery date and agreed to these terms.     According to [place] Case Law and [facility] standards of care, with an appropriate letter of support from a mental health provider, top surgery/mastectomy is medically necessary for the treatment of gender dysphoria.     Total time = 60 minutes, spent on the date of encounter doing chart review, history and physical, dressing changes, documentation, patient education, and any further activity as noted above.     This note was prepared on behalf of Emely Harding MD by Joyce Taylor (she/they), a trained medical scribe, based on my observations and the provider's statements to me.       Depression Screening Follow Up        5/19/2025     1:16 PM   PHQ   PHQ-9 Total Score 15   Q9: Thoughts of better off dead/self-harm past 2 weeks Not at all         5/19/2025      1:16 PM   Last PHQ-9   1.  Little interest or pleasure in doing things 2   2.  Feeling down, depressed, or hopeless 1   3.  Trouble falling or staying asleep, or sleeping too much 3   4.  Feeling tired or having little energy 3   5.  Poor appetite or overeating 1   6.  Feeling bad about yourself 1   7.  Trouble concentrating 3   8.  Moving slowly or restless 1   Q9: Thoughts of better off dead/self-harm past 2 weeks 0   PHQ-9 Total Score 15   Difficulty at work, home, or with people Extremely dIfficult         Follow Up Actions Taken  RN discussed the following with Keri: The patient explains that they have MDD and anxiety and regularly score high on PHQ-9, however today is higher than it has typically been. Pih describes that they have just finished a stressful semester in college, they found out their job is closing down, and they have stressors about limited funds to support themself. They are also working on the paperwork to get more services and disability but this is a lot of burden as well. They were recently diagnosed with ASD and they also have a ADHD diagnosis.   Keri reports feeling safe. They are currently seeing a therapist regularly. They have a psychiatrist and are wanting to consolidate the providers prescribing psych meds to one person as there are currently two. Keri does have an [employer] worker and is getting assistance with job searching. They report they are open to additional resources and actively seeking this for themself as well.   Vesta Negrete RN on 5/19/2025 at 1:46 PM         Again, thank you for allowing me to participate in the care of your patient.      Sincerely,    Emely Harding MD

## 2025-05-19 NOTE — NURSING NOTE
"Chief Complaint   Patient presents with    Consult     Keri, is being seen today for a consult regarding top surgery.       Vitals:    05/19/25 1313   BP: 136/88   BP Location: Left arm   Patient Position: Chair   Cuff Size: Adult Regular   Pulse: 75   SpO2: 97%   Weight: (!) 158.8 kg (350 lb)   Height: 1.659 m (5' 5.32\")       Body mass index is 57.67 kg/m .      Nikki Mehta LPN    "

## 2025-05-19 NOTE — PROGRESS NOTES
"PLASTICS NEW TOP   HPI: This is a 24 year old non-binary adult with a history of gender dysphoria who presents today for a consultation for top surgery. Their pronouns are they/them and their preferred name is Keri. They had a consult with Dr. Blanco in  but \"life happened\" so they weren't able to get a letter of support before Francis had decided to leave the practice. They were originally referred by their PCP. Today's appointment was originally scheduled for 2026 but was luckily moved up to today due to waitlist. Their therapist is Dr. Lien Garza from "[employer]" x6 months who has written them a letter of support in 2024, which they will upload via Memoright. They are not on testosterone and are hesitant to start since they're worried about changes in their voice as they are a . They have been living their chosen gender identity/role for 7ish years. They do not bind secondary to asthma and other reasons. No breast lumps, skin puckering, nipple drainage, or other breast problems. No history of breast imaging, including mammogram or breast ultrasound.     Medical Hx: ELKIN with CPAP but struggles to use it consistently secondary to difficulty with mask fit and sensory issues. Intermittent/exercise induced asthma - has rescue inhaler. IBS. SIBO test pending. Food sensitivities. HSD (possibly hEDS). POTS, MCAS, etc. testing is pending. C. Diff history.    No history of diabetes mellitus or GERD. No bleeding, clotting, healing or scarring problems.    Mental Hx: Gender dysphoria. MDD, FRANKLYN, ADHD - predominantly inattentive type, ASD. Question C-PTSD.    Surgical Hx: 3rd molar removal. Colonoscopy. No adverse effects from anesthesia.     Family Hx: PGM and paternal aunt with some sort of gyn cancer, both are . PGM also history of multiple CVAs and  around age 90. Mom had a DVT, etiology unknown. Dad with kidney stones and lymphoma in remission x1 year. 2 " "siblings medically healthy. No family history of breast cancer.     Social Hx:   Occupation: Has to look for a new job soon as current company is closing, student at [employer]   Relationship status/family: Lives in [place] with 2 cats; from [place] originally. Plans to either stay with parents and/or professor/mentor post-op.   Smoking status: Rare, edibles only.   Alcohol use: Rare  Diet: history of vegetarianism, back to omnivore as of past year or so.  Caffeine: Occasional, has a personal limit of 200mg daily but usually does not have caffeine unless really needed    Exercise: No regular form. Long term goal of increasing regular movement for overall health improvement.   Sleep: Averages 10-12 hours. Takes trazodone 150mg for sleep with inconsistent relief, planning to look at other options.     PE: General: Height: 5' 5.32\" Weight: 350 lbs 0 oz BMI:Body mass index is 57.67 kg/m . MO.   Chest:   Nice upper chest contour.   Grade 3 nipple ptosis. Incisions likely to meet in the middle. Faint striae.  Left breast (>1kg) is slightly larger than the right (>1kg).   IMFs situated about 2 cms below the pec muscle.   Right IMF situated about 0 cms lower than the left.   Moderate lateral thoracic roll.   Moderate anterior axillary breast tissue.    No lymphadenopathy or masses.   Photos taken with consent.     A&P: 24 year old non-binary adult who is a good candidate for gender affirming top surgery. They will most likely need a bilateral simple double-incision mastectomy WITHOUT nipple graft reconstruction depending on intraoperative findings and the patient's desired outcome. The patient is not interested in nipple grafts. The patient will NOT need a pre-op mammogram in addition to an H&P from their PCP.   Would prefer to schedule over their school's winter break 2025.     They did meet with our Transgender , David. They did receive an introductory folder of information and contact numbers/names. Any " further discussion of risks and complications will be reviewed during the pre-op visit.    Patient accepts the risks of this procedure and would like to proceed with surgery. They are able to give informed consent for this medically necessary procedure. While every effort will be made to mitigate gender dysphoria with a first-stage mastectomy, the patient may also require a possible second-stage procedure to address residual deformities causing persistent dysphoria.   Has MA for health insurance which should be fine throughout the job transition.     Once we receive and review their therapist letter of support we will initiate the prior authorization process.     Patient was counseled regarding the requirement to abstain from using nicotine products within at least 6 weeks before surgery date and agreed to these terms.     According to [place] Case Law and [facility] standards of care, with an appropriate letter of support from a mental health provider, top surgery/mastectomy is medically necessary for the treatment of gender dysphoria.     Total time = 60 minutes, spent on the date of encounter doing chart review, history and physical, dressing changes, documentation, patient education, and any further activity as noted above.     This note was prepared on behalf of Emely Harding MD by Joyce Taylor (she/they), a trained medical scribe, based on my observations and the provider's statements to me.

## 2025-05-19 NOTE — PROGRESS NOTES
Depression Screening Follow Up        5/19/2025     1:16 PM   PHQ   PHQ-9 Total Score 15   Q9: Thoughts of better off dead/self-harm past 2 weeks Not at all         5/19/2025     1:16 PM   Last PHQ-9   1.  Little interest or pleasure in doing things 2   2.  Feeling down, depressed, or hopeless 1   3.  Trouble falling or staying asleep, or sleeping too much 3   4.  Feeling tired or having little energy 3   5.  Poor appetite or overeating 1   6.  Feeling bad about yourself 1   7.  Trouble concentrating 3   8.  Moving slowly or restless 1   Q9: Thoughts of better off dead/self-harm past 2 weeks 0   PHQ-9 Total Score 15   Difficulty at work, home, or with people Extremely dIfficult         Follow Up Actions Taken  RN discussed the following with Keri: The patient explains that they have MDD and anxiety and regularly score high on PHQ-9, however today is higher than it has typically been. Phi describes that they have just finished a stressful semester in college, they found out their job is closing down, and they have stressors about limited funds to support themself. They are also working on the paperwork to get more services and disability but this is a lot of burden as well. They were recently diagnosed with ASD and they also have a ADHD diagnosis.   Keri reports feeling safe. They are currently seeing a therapist regularly. They have a psychiatrist and are wanting to consolidate the providers prescribing psych meds to one person as there are currently two. Keri does have an [employer] worker and is getting assistance with job searching. They report they are open to additional resources and actively seeking this for themself as well.   Vesta Negrete RN on 5/19/2025 at 1:46 PM

## 2025-05-29 ENCOUNTER — PATIENT OUTREACH (OUTPATIENT)
Dept: PLASTIC SURGERY | Facility: CLINIC | Age: 25
End: 2025-05-29
Payer: COMMERCIAL

## 2025-05-29 NOTE — PROGRESS NOTES
[facility] :  Letter of Support Review     SITUATION   Keri (they/them/theirs) is a 24 year old adult who is receiving support for:  Social Work Services (Letter of Support Review )  .    BACKGROUND   Writer received and reviewed clinicals to support PA for gender affirming surgery. Clincal review were based on medical policy set by [facility] and [facility] [facility].     Surgery              CGC Assessment  Comprehensive Gender Care (CGC) Enrollment: Enrolled  Patient has a therapist: Yes  Letter of support #1: Received  Letter #1 Date: 03/18/25  Surgery being considered: Yes  Mastectomy: Yes    ASSESSMENT     Surgery: Mastectomy     LOS # 1: needs updates   LOS # 2: na  DA: na    Comments: LOS will need updates. Sent message to patient requesting updates.    PLAN     Social Work Interventions: LOS review    For prior authorization, submit the following clinicals:  Surgery: Mastectomy     LOS 1: pending  LOS 2: na  DA: na    Follow-up plan:  upload updated LOS      RADHA Muir

## 2025-06-09 ENCOUNTER — VIRTUAL VISIT (OUTPATIENT)
Dept: FAMILY MEDICINE | Facility: OTHER | Age: 25
End: 2025-06-09
Payer: COMMERCIAL

## 2025-06-09 ENCOUNTER — MYC MEDICAL ADVICE (OUTPATIENT)
Dept: FAMILY MEDICINE | Facility: OTHER | Age: 25
End: 2025-06-09

## 2025-06-09 DIAGNOSIS — G47.33 OSA (OBSTRUCTIVE SLEEP APNEA): ICD-10-CM

## 2025-06-09 DIAGNOSIS — F41.1 GAD (GENERALIZED ANXIETY DISORDER): ICD-10-CM

## 2025-06-09 DIAGNOSIS — G47.00 INSOMNIA, UNSPECIFIED TYPE: ICD-10-CM

## 2025-06-09 DIAGNOSIS — F84.0 AUTISM: ICD-10-CM

## 2025-06-09 DIAGNOSIS — K58.9 IRRITABLE BOWEL SYNDROME, UNSPECIFIED TYPE: ICD-10-CM

## 2025-06-09 DIAGNOSIS — E66.813 CLASS 3 SEVERE OBESITY WITH SERIOUS COMORBIDITY AND BODY MASS INDEX (BMI) OF 50.0 TO 59.9 IN ADULT, UNSPECIFIED OBESITY TYPE (H): ICD-10-CM

## 2025-06-09 DIAGNOSIS — E74.10 FRUCTOSE MALABSORPTION: ICD-10-CM

## 2025-06-09 DIAGNOSIS — F88 SENSORY PROCESSING DIFFICULTY: Primary | ICD-10-CM

## 2025-06-09 DIAGNOSIS — F33.2 SEVERE EPISODE OF RECURRENT MAJOR DEPRESSIVE DISORDER, WITHOUT PSYCHOTIC FEATURES (H): ICD-10-CM

## 2025-06-09 DIAGNOSIS — F90.0 ATTENTION DEFICIT HYPERACTIVITY DISORDER (ADHD), PREDOMINANTLY INATTENTIVE TYPE: Primary | ICD-10-CM

## 2025-06-09 PROCEDURE — 98006 SYNCH AUDIO-VIDEO EST MOD 30: CPT | Performed by: PHYSICIAN ASSISTANT

## 2025-06-09 RX ORDER — LISDEXAMFETAMINE DIMESYLATE 60 MG/1
60 CAPSULE ORAL EVERY MORNING
Qty: 30 CAPSULE | Refills: 0 | Status: SHIPPED | OUTPATIENT
Start: 2025-06-09

## 2025-06-09 ASSESSMENT — PATIENT HEALTH QUESTIONNAIRE - PHQ9
SUM OF ALL RESPONSES TO PHQ QUESTIONS 1-9: 15
SUM OF ALL RESPONSES TO PHQ QUESTIONS 1-9: 15
10. IF YOU CHECKED OFF ANY PROBLEMS, HOW DIFFICULT HAVE THESE PROBLEMS MADE IT FOR YOU TO DO YOUR WORK, TAKE CARE OF THINGS AT HOME, OR GET ALONG WITH OTHER PEOPLE: EXTREMELY DIFFICULT

## 2025-06-09 ASSESSMENT — ANXIETY QUESTIONNAIRES
5. BEING SO RESTLESS THAT IT IS HARD TO SIT STILL: MORE THAN HALF THE DAYS
2. NOT BEING ABLE TO STOP OR CONTROL WORRYING: NEARLY EVERY DAY
3. WORRYING TOO MUCH ABOUT DIFFERENT THINGS: SEVERAL DAYS
4. TROUBLE RELAXING: NEARLY EVERY DAY
GAD7 TOTAL SCORE: 11
6. BECOMING EASILY ANNOYED OR IRRITABLE: NOT AT ALL
8. IF YOU CHECKED OFF ANY PROBLEMS, HOW DIFFICULT HAVE THESE MADE IT FOR YOU TO DO YOUR WORK, TAKE CARE OF THINGS AT HOME, OR GET ALONG WITH OTHER PEOPLE?: SOMEWHAT DIFFICULT
7. FEELING AFRAID AS IF SOMETHING AWFUL MIGHT HAPPEN: SEVERAL DAYS
GAD7 TOTAL SCORE: 11
GAD7 TOTAL SCORE: 11
IF YOU CHECKED OFF ANY PROBLEMS ON THIS QUESTIONNAIRE, HOW DIFFICULT HAVE THESE PROBLEMS MADE IT FOR YOU TO DO YOUR WORK, TAKE CARE OF THINGS AT HOME, OR GET ALONG WITH OTHER PEOPLE: SOMEWHAT DIFFICULT
7. FEELING AFRAID AS IF SOMETHING AWFUL MIGHT HAPPEN: SEVERAL DAYS
1. FEELING NERVOUS, ANXIOUS, OR ON EDGE: SEVERAL DAYS

## 2025-06-09 ASSESSMENT — ASTHMA QUESTIONNAIRES
QUESTION_2 LAST FOUR WEEKS HOW OFTEN HAVE YOU HAD SHORTNESS OF BREATH: ONCE OR TWICE A WEEK
QUESTION_1 LAST FOUR WEEKS HOW MUCH OF THE TIME DID YOUR ASTHMA KEEP YOU FROM GETTING AS MUCH DONE AT WORK, SCHOOL OR AT HOME: NONE OF THE TIME
QUESTION_4 LAST FOUR WEEKS HOW OFTEN HAVE YOU USED YOUR RESCUE INHALER OR NEBULIZER MEDICATION (SUCH AS ALBUTEROL): NOT AT ALL
QUESTION_3 LAST FOUR WEEKS HOW OFTEN DID YOUR ASTHMA SYMPTOMS (WHEEZING, COUGHING, SHORTNESS OF BREATH, CHEST TIGHTNESS OR PAIN) WAKE YOU UP AT NIGHT OR EARLIER THAN USUAL IN THE MORNING: NOT AT ALL
ACT_TOTALSCORE: 24
QUESTION_5 LAST FOUR WEEKS HOW WOULD YOU RATE YOUR ASTHMA CONTROL: COMPLETELY CONTROLLED

## 2025-06-09 NOTE — PROGRESS NOTES
Keri is a 24 year old who is being evaluated via a billable video visit.    How would you like to obtain your AVS? MyChart  If the video visit is dropped, the invitation should be resent by: Text to cell phone: [phone]  Will anyone else be joining your video visit? No      Assessment & Plan     Attention deficit hyperactivity disorder (ADHD), predominantly inattentive type  Increased Vyvanse to 60 mg once daily to see if this helps. If not tolerating go back down to 50 mg. If tolerating will send in 60  mg for 3 months to pharmacy.   - Adult Mental Health  Referral; Future  - lisdexamfetamine (VYVANSE) 60 MG capsule; Take 1 capsule (60 mg) by mouth every morning.    Autism  They are interested in considering OT for management as well, they will find a OT near them and send contact information so we can put a referral in.   - Adult Mental Health  Referral; Future    ELKIN (obstructive sleep apnea)  Discussed Inspire to consider.   They want to try full face and nose mask together.    Hopefully OT would be helpful for sensory processing so they can tolerate facial mask better.   - Adult Comprehensive Weight Management  Referral; Future  - CPAP Order for DME - ONLY FOR DME    Class 3 severe obesity with serious comorbidity and body mass index (BMI) of 50.0 to 59.9 in adult, unspecified obesity type (H)  Didn't tolerate oral or injectable medications with significant weight loss. We discussed options for bariatric surgery, they are interested, we discussed the benefits.    - Adult Comprehensive Weight Management  Referral; Future    Severe episode of recurrent major depressive disorder, without psychotic features (H)  FRANKLYN (generalized anxiety disorder)  Continue with counseling as planned.   Referral for Collaborative care psychiatry as they are interested in seeing if there is anything else that can be done for overall mental health.   - Adult Mental Health  Referral;  "Future    Insomnia, unspecified type  Considering adjustment from Trazodone. Did discuss if ELKIN is not well managed then we could be causing more harm than good by just treating the insomnia. Considering Amitriptyline, mirtazepine, clonidine, seroquel or other insomnia medications.   - Adult Mental Health  Referral; Future    Irritable bowel syndrome, unspecified type  Fructose malabsorption  Newer diagnoses.            BMI  Estimated body mass index is 57.67 kg/m  as calculated from the following:    Height as of 5/19/25: 1.659 m (5' 5.32\").    Weight as of 5/19/25: 158.8 kg (350 lb).   Weight management plan: Patient referred to endocrine and/or weight management specialty      Options for treatment and follow-up care were reviewed with the patient and/or guardian. Patient and/or guardian engaged in the decision making process and verbalized understanding of the options discussed and agreed with the final plan.    The longitudinal plan of care for the diagnosis(es)/condition(s) as documented were addressed during this visit. Due to the added complexity in care, I will continue to support Keri in the subsequent management and with ongoing continuity of care.      Alexa Saavedra is a 24 year old, presenting for the following health issues:  No chief complaint on file.      Video Start Time: 10:55 AM    HPI      Things are not going great right now. They note they were diagnosed recently with Autism.  They are trying to pursue other health conditions with the PCP that they see in [place].  Diagnosed with IBS.  Also recently diagnosed with fructose malabsorption.    Struggling to keep up with the responsibilities of adulthood and independence.  They are pursuing [facility] services as well.  They have an [employer] worker and getting set up with vocational rehab.  The last semester of school went poorly. Feels like they are struggling a lot with executive functioning. Therapist does manage individuals with " Autism.      Struggling with starting tasks and staying on tasks.  Her ADHD and Autism are not helping with this.  Currently seeing a therapist.  They are thinking of getting a psychiatrist to look at sleep medications.  Not doing great with using CPAP (when using it consistently the mask was very distracting sensory so was struggling more to fall asleep and stay asleep, uses the nose cushion so doesn't think that was helping a lot, was waking up with dry mouth and very congested), they want to try something that covers nose and mouth.      Medical Supply Store - [facility]. Full Face Mask.   Sleep Medicine adjustment - wants to try the face/nose mask.       Review of Systems  Constitutional, neuro, ENT, endocrine, pulmonary, cardiac, gastrointestinal, genitourinary, musculoskeletal, integument and psychiatric systems are negative, except as otherwise noted.      Objective           Vitals:  No vitals were obtained today due to virtual visit.    Physical Exam   GENERAL: alert and no distress  EYES: Eyes grossly normal to inspection.  No discharge or erythema, or obvious scleral/conjunctival abnormalities.  RESP: No audible wheeze, cough, or visible cyanosis.    SKIN: Visible skin clear. No significant rash, abnormal pigmentation or lesions.  NEURO: Cranial nerves grossly intact.  Mentation and speech appropriate for age.  PSYCH: Appropriate affect, tone, and pace of words          Video-Visit Details    Type of service:  Video Visit   Video End Time:11:17 AM  Originating Location (pt. Location): Home    Distant Location (provider location):  Off-site  Platform used for Video Visit: Domenico  Signed Electronically by: Tiffanie Sequeira PA-C

## 2025-06-10 ENCOUNTER — PATIENT OUTREACH (OUTPATIENT)
Dept: CARE COORDINATION | Facility: CLINIC | Age: 25
End: 2025-06-10
Payer: COMMERCIAL

## 2025-06-10 ENCOUNTER — DOCUMENTATION ONLY (OUTPATIENT)
Dept: FAMILY MEDICINE | Facility: OTHER | Age: 25
End: 2025-06-10
Payer: COMMERCIAL

## 2025-06-10 DIAGNOSIS — G47.33 OSA (OBSTRUCTIVE SLEEP APNEA): Primary | ICD-10-CM

## 2025-06-11 NOTE — TELEPHONE ENCOUNTER
Fax OT order to   [facility]  [address] [facility], [place] [address]  Phone: [phone]

## 2025-06-12 ENCOUNTER — TELEPHONE (OUTPATIENT)
Dept: FAMILY MEDICINE | Facility: OTHER | Age: 25
End: 2025-06-12
Payer: COMMERCIAL

## 2025-06-12 RX ORDER — MIRTAZAPINE 7.5 MG/1
7.5-15 TABLET, FILM COATED ORAL AT BEDTIME
Qty: 60 TABLET | Refills: 1 | Status: SHIPPED | OUTPATIENT
Start: 2025-06-12

## 2025-06-12 NOTE — TELEPHONE ENCOUNTER
mirtazapine (REMERON) 7.5 MG tablet     Prior Authorization Retail Medication Request    Medication/Dose: mirtazapine (REMERON) 7.5 MG tablet  Diagnosis and ICD code (if different than what is on RX):    New/renewal/insurance change PA/secondary ins. PA:  Previously Tried and Failed:    Rationale:      Insurance   Primary:   Insurance ID:      Secondary (if applicable):  Insurance ID:      Pharmacy Information (if different than what is on RX)  Name:    Phone:    Fax:    Clinic Information  Preferred routing pool for dept communication:

## 2025-06-14 NOTE — TELEPHONE ENCOUNTER
Retail Pharmacy Prior Authorization Team   Phone: [phone]    PA Initiation    Medication: MIRTAZAPINE 7.5 MG PO TABS  Insurance Company: [facility] - Phone [phone] Fax [phone]  Pharmacy Filling the Rx: [facility] [address] [place] [address]  Filling Pharmacy Phone: [phone]  Filling Pharmacy Fax:    Start Date: 6/14/2025    DEJA OSHEA ([place]: [id number])

## 2025-06-15 NOTE — TELEPHONE ENCOUNTER
Prior Authorization Approval    Medication: MIRTAZAPINE 7.5 MG PO TABS  Authorization Effective Date: 3/16/2025  Authorization Expiration Date: 6/14/2026  Insurance Company: [employer] [place] - Phone [phone] Fax [phone]  Which Pharmacy is filling the prescription: [facility] [address] [place] [address]  Pharmacy Notified: YES  Patient Notified: YES (faxed letter to pharmacy and notified patient via [employer] message)

## 2025-06-19 ENCOUNTER — PATIENT OUTREACH (OUTPATIENT)
Dept: CARE COORDINATION | Facility: CLINIC | Age: 25
End: 2025-06-19

## 2025-06-25 ENCOUNTER — DOCUMENTATION ONLY (OUTPATIENT)
Dept: PLASTIC SURGERY | Facility: CLINIC | Age: 25
End: 2025-06-25
Payer: COMMERCIAL

## 2025-06-25 NOTE — PROGRESS NOTES
[facility] :  Care Coordination Note     SITUATION   Keri Rai is a 24 year old adult who is receiving support for: gender dysphoria.    BACKGROUND     Pt has seen Dr Harding for gender affirming top surgery consultation.  Pt has surgery scheduled for 12/30/2025.    ASSESSMENT     Pt LOS in chart dated 6/2/2025 and reviewed by our .  Pt does not need a pre-op mammogram. Pt does not use nicotine.      PLAN     Follow-up plan:    Will contact pt to inform of need for pre-op H&P within 30 days of surgery.  Pt to have pre-op surgical consultation with Dr Harding 12/30/2025.     JOSE G Lemons

## 2025-07-11 NOTE — TELEPHONE ENCOUNTER
"Tami Rai is a 17 year old female     PRESENTING PROBLEM:  Fatigue, headache, earache, shaky    NURSING ASSESSMENT:  Description:  Spoke with pt's mom who is concerned regarding pt's symptoms.  She states that pt has had \"upper respiratory crud\" for almost a week.  Last night pt complained of being shaky after she ate dinner.  Pt has been drinking plenty of fluids, she states she has been more thirsty lately.  Onset/duration:  Thursday-headache, fatigue, earache, congestion.  Last night-shaky.  Precip. factors:  none  Associated symptoms:  Headache, fatigue, earache, congestion.  Denies being shaky today, fever, cough.  Improves/worsens symptoms:  none  Pain scale (0-10)   0/10  I & O/eating:   normal  Activity:  tired  Temp.:  98.2    Allergies:   Allergies   Allergen Reactions     Amoxicillin        RECOMMENDED DISPOSITION:  See in 24 hours - Scheduled.  Next 5 appointments (look out 90 days)     Sep 13, 2017 11:00 AM CDT   Office Visit with JOAN Ruggiero CNP   [facility]    [address] [place] [address] [id number] [phone]                Will comply with recommendation: Yes  If further questions/concerns or if symptoms do not improve, worsen or new symptoms develop, call your PCP or [facility] as soon as possible.      Guideline used: headache, cold, earache  Pediatric Telephone Advice, 14th Edition, Louie Philip RN    " temporal

## 2025-08-21 ENCOUNTER — VIRTUAL VISIT (OUTPATIENT)
Dept: SLEEP MEDICINE | Facility: CLINIC | Age: 25
End: 2025-08-21
Payer: COMMERCIAL

## 2025-08-21 VITALS — HEIGHT: 65 IN | BODY MASS INDEX: 58.24 KG/M2

## 2025-08-21 DIAGNOSIS — G47.33 OSA (OBSTRUCTIVE SLEEP APNEA): ICD-10-CM

## 2025-08-21 DIAGNOSIS — F51.04 CHRONIC INSOMNIA: Primary | ICD-10-CM

## 2025-08-21 ASSESSMENT — SLEEP AND FATIGUE QUESTIONNAIRES
HOW LIKELY ARE YOU TO NOD OFF OR FALL ASLEEP WHILE SITTING QUIETLY AFTER LUNCH WITHOUT ALCOHOL: SLIGHT CHANCE OF DOZING
HOW LIKELY ARE YOU TO NOD OFF OR FALL ASLEEP WHILE SITTING AND TALKING TO SOMEONE: WOULD NEVER DOZE
HOW LIKELY ARE YOU TO NOD OFF OR FALL ASLEEP WHILE SITTING INACTIVE IN A PUBLIC PLACE: SLIGHT CHANCE OF DOZING
HOW LIKELY ARE YOU TO NOD OFF OR FALL ASLEEP WHILE SITTING AND READING: SLIGHT CHANCE OF DOZING
HOW LIKELY ARE YOU TO NOD OFF OR FALL ASLEEP WHILE WATCHING TV: HIGH CHANCE OF DOZING
HOW LIKELY ARE YOU TO NOD OFF OR FALL ASLEEP IN A CAR, WHILE STOPPED FOR A FEW MINUTES IN TRAFFIC: WOULD NEVER DOZE
HOW LIKELY ARE YOU TO NOD OFF OR FALL ASLEEP WHILE LYING DOWN TO REST IN THE AFTERNOON WHEN CIRCUMSTANCES PERMIT: HIGH CHANCE OF DOZING
HOW LIKELY ARE YOU TO NOD OFF OR FALL ASLEEP WHEN YOU ARE A PASSENGER IN A CAR FOR AN HOUR WITHOUT A BREAK: HIGH CHANCE OF DOZING

## 2025-08-21 ASSESSMENT — PAIN SCALES - GENERAL: PAINLEVEL_OUTOF10: MILD PAIN (2)

## 2025-08-28 ENCOUNTER — VIRTUAL VISIT (OUTPATIENT)
Dept: PSYCHIATRY | Facility: CLINIC | Age: 25
End: 2025-08-28
Attending: PHYSICIAN ASSISTANT
Payer: COMMERCIAL

## 2025-08-28 DIAGNOSIS — F41.1 GAD (GENERALIZED ANXIETY DISORDER): ICD-10-CM

## 2025-08-28 DIAGNOSIS — F84.0 AUTISM: ICD-10-CM

## 2025-08-28 DIAGNOSIS — G47.00 INSOMNIA, UNSPECIFIED TYPE: ICD-10-CM

## 2025-08-28 DIAGNOSIS — F33.1 MODERATE EPISODE OF RECURRENT MAJOR DEPRESSIVE DISORDER (H): Primary | ICD-10-CM

## 2025-08-28 DIAGNOSIS — G47.33 OSA (OBSTRUCTIVE SLEEP APNEA): ICD-10-CM

## 2025-08-28 DIAGNOSIS — F90.0 ATTENTION DEFICIT HYPERACTIVITY DISORDER (ADHD), PREDOMINANTLY INATTENTIVE TYPE: ICD-10-CM

## 2025-08-28 RX ORDER — MIRTAZAPINE 7.5 MG/1
TABLET, FILM COATED ORAL
Qty: 30 TABLET | Refills: 0 | Status: SHIPPED | OUTPATIENT
Start: 2025-08-28

## 2025-08-28 RX ORDER — LISDEXAMFETAMINE DIMESYLATE 70 MG/1
70 CAPSULE ORAL EVERY MORNING
Qty: 30 CAPSULE | Refills: 0 | Status: SHIPPED | OUTPATIENT
Start: 2025-08-28

## 2025-08-28 ASSESSMENT — PATIENT HEALTH QUESTIONNAIRE - PHQ9
10. IF YOU CHECKED OFF ANY PROBLEMS, HOW DIFFICULT HAVE THESE PROBLEMS MADE IT FOR YOU TO DO YOUR WORK, TAKE CARE OF THINGS AT HOME, OR GET ALONG WITH OTHER PEOPLE: EXTREMELY DIFFICULT
SUM OF ALL RESPONSES TO PHQ QUESTIONS 1-9: 15
SUM OF ALL RESPONSES TO PHQ QUESTIONS 1-9: 15

## 2025-09-01 ENCOUNTER — PATIENT OUTREACH (OUTPATIENT)
Dept: CARE COORDINATION | Facility: CLINIC | Age: 25
End: 2025-09-01
Payer: COMMERCIAL